# Patient Record
Sex: MALE | Race: WHITE | NOT HISPANIC OR LATINO | Employment: FULL TIME | URBAN - METROPOLITAN AREA
[De-identification: names, ages, dates, MRNs, and addresses within clinical notes are randomized per-mention and may not be internally consistent; named-entity substitution may affect disease eponyms.]

---

## 2017-01-06 RX ORDER — DOXYCYCLINE 100 MG/1
CAPSULE ORAL
Qty: 30 CAPSULE | Refills: 3 | Status: SHIPPED | OUTPATIENT
Start: 2017-01-06 | End: 2017-05-30 | Stop reason: SDUPTHER

## 2017-03-14 DIAGNOSIS — F41.9 ANXIETY: ICD-10-CM

## 2017-03-14 DIAGNOSIS — Z00.00 HEALTHCARE MAINTENANCE: Primary | ICD-10-CM

## 2017-03-21 LAB
ALBUMIN SERPL-MCNC: 5.1 G/DL (ref 3.5–5.2)
ALBUMIN/GLOB SERPL: 2.7 G/DL
ALP SERPL-CCNC: 60 U/L (ref 39–117)
ALT SERPL-CCNC: 16 U/L (ref 1–41)
APPEARANCE UR: CLEAR
AST SERPL-CCNC: 20 U/L (ref 1–40)
BACTERIA #/AREA URNS HPF: NORMAL /HPF
BASOPHILS # BLD AUTO: 0.02 10*3/MM3 (ref 0–0.2)
BASOPHILS NFR BLD AUTO: 0.3 % (ref 0–1.5)
BILIRUB SERPL-MCNC: 0.8 MG/DL (ref 0.1–1.2)
BILIRUB UR QL STRIP: NEGATIVE
BUN SERPL-MCNC: 10 MG/DL (ref 6–20)
BUN/CREAT SERPL: 10.5 (ref 7–25)
C TRACH RRNA SPEC QL NAA+PROBE: NEGATIVE
CALCIUM SERPL-MCNC: 10.1 MG/DL (ref 8.6–10.5)
CHLORIDE SERPL-SCNC: 93 MMOL/L (ref 98–107)
CHOLEST SERPL-MCNC: 140 MG/DL (ref 0–200)
CO2 SERPL-SCNC: 25.6 MMOL/L (ref 22–29)
COLOR UR: YELLOW
CREAT SERPL-MCNC: 0.95 MG/DL (ref 0.76–1.27)
EOSINOPHIL # BLD AUTO: 0.03 10*3/MM3 (ref 0–0.7)
EOSINOPHIL NFR BLD AUTO: 0.4 % (ref 0.3–6.2)
EPI CELLS #/AREA URNS HPF: NORMAL /HPF
ERYTHROCYTE [DISTWIDTH] IN BLOOD BY AUTOMATED COUNT: 12.6 % (ref 11.5–14.5)
GLOBULIN SER CALC-MCNC: 1.9 GM/DL
GLUCOSE SERPL-MCNC: 86 MG/DL (ref 65–99)
GLUCOSE UR QL: NEGATIVE
HCT VFR BLD AUTO: 43.2 % (ref 40.4–52.2)
HDLC SERPL-MCNC: 45 MG/DL (ref 40–60)
HGB BLD-MCNC: 14.7 G/DL (ref 13.7–17.6)
HGB UR QL STRIP: NEGATIVE
IMM GRANULOCYTES # BLD: 0.03 10*3/MM3 (ref 0–0.03)
IMM GRANULOCYTES NFR BLD: 0.4 % (ref 0–0.5)
KETONES UR QL STRIP: NEGATIVE
LDLC SERPL CALC-MCNC: 80 MG/DL (ref 0–100)
LEUKOCYTE ESTERASE UR QL STRIP: NEGATIVE
LYMPHOCYTES # BLD AUTO: 2.48 10*3/MM3 (ref 0.9–4.8)
LYMPHOCYTES NFR BLD AUTO: 35.6 % (ref 19.6–45.3)
MCH RBC QN AUTO: 29.1 PG (ref 27–32.7)
MCHC RBC AUTO-ENTMCNC: 34 G/DL (ref 32.6–36.4)
MCV RBC AUTO: 85.5 FL (ref 79.8–96.2)
MICRO URNS: ABNORMAL
MICRO URNS: ABNORMAL
MONOCYTES # BLD AUTO: 0.65 10*3/MM3 (ref 0.2–1.2)
MONOCYTES NFR BLD AUTO: 9.3 % (ref 5–12)
N GONORRHOEA RRNA SPEC QL NAA+PROBE: NEGATIVE
NEUTROPHILS # BLD AUTO: 3.75 10*3/MM3 (ref 1.9–8.1)
NEUTROPHILS NFR BLD AUTO: 54 % (ref 42.7–76)
NITRITE UR QL STRIP: NEGATIVE
PH UR STRIP: 7 [PH] (ref 5–7.5)
PLATELET # BLD AUTO: 315 10*3/MM3 (ref 140–500)
POTASSIUM SERPL-SCNC: 3.8 MMOL/L (ref 3.5–5.2)
PROT SERPL-MCNC: 7 G/DL (ref 6–8.5)
PROT UR QL STRIP: NEGATIVE
RBC # BLD AUTO: 5.05 10*6/MM3 (ref 4.6–6)
RBC #/AREA URNS HPF: NORMAL /HPF
SODIUM SERPL-SCNC: 134 MMOL/L (ref 136–145)
SP GR UR: <=1.005 (ref 1–1.03)
TRIGL SERPL-MCNC: 73 MG/DL (ref 0–150)
TSH SERPL DL<=0.005 MIU/L-ACNC: 1.55 MIU/ML (ref 0.27–4.2)
URINALYSIS REFLEX: ABNORMAL
UROBILINOGEN UR STRIP-MCNC: 0.2 MG/DL (ref 0.2–1)
VLDLC SERPL CALC-MCNC: 14.6 MG/DL (ref 5–40)
WBC # BLD AUTO: 6.96 10*3/MM3 (ref 4.5–10.7)
WBC #/AREA URNS HPF: NORMAL /HPF

## 2017-03-24 ENCOUNTER — OFFICE VISIT (OUTPATIENT)
Dept: INTERNAL MEDICINE | Facility: CLINIC | Age: 22
End: 2017-03-24

## 2017-03-24 VITALS
HEIGHT: 70 IN | RESPIRATION RATE: 12 BRPM | BODY MASS INDEX: 22.48 KG/M2 | SYSTOLIC BLOOD PRESSURE: 130 MMHG | OXYGEN SATURATION: 100 % | TEMPERATURE: 98.1 F | HEART RATE: 96 BPM | DIASTOLIC BLOOD PRESSURE: 80 MMHG | WEIGHT: 157 LBS

## 2017-03-24 DIAGNOSIS — F41.9 ANXIETY: ICD-10-CM

## 2017-03-24 DIAGNOSIS — L70.0 ACNE VULGARIS: ICD-10-CM

## 2017-03-24 DIAGNOSIS — Z00.00 HEALTHCARE MAINTENANCE: Primary | ICD-10-CM

## 2017-03-24 PROCEDURE — 99395 PREV VISIT EST AGE 18-39: CPT | Performed by: INTERNAL MEDICINE

## 2017-03-24 NOTE — PROGRESS NOTES
"Annual Exam (review of medical issues )      KATELIN  Edson Ibarra is a 21 y.o. male RTC in yearly CPE, review of medical issues:   Getting ready to graduate in May. Taken a job with [employer].   1. Acne - overall has been good.  Is using ProActiv at this point but off Adapalene as felt like it did not help. Feels like Doxycycline is helping.  Back lesion are better. Not really getting any whiteheads.  No issues with tolerance of Doxycycline.      off all agents at this time except OTC ProActiv, which we discussed is not likely to be effective long term. OK to hold off on oral doxycycline restart as pt back acne has largely resolved at this time. However, remains with closed comedones on face today despite use of ProActiv. Restart Adapalene topical today. Call if back acne returns and will consider re-addition of oral Abx.   2. Anxiety - \"been under control\".  Thinks family would agree with this.  Notes \"has been too busy to worry about anything\".  Does not feel like phobias are getting in the way right now.  Never ended up seeing talk therapist more than one time.      doing well currently with noted past Health related phobias. Pt has psychology name and number to re-establish with if sx recur.    3. Hx elevated LFT's - has not had time to drink heavily, no real issues. Is trying to keep excess drinking under control.       Review of Systems   Constitution: Negative for chills, fever, malaise/fatigue, weight gain and weight loss.   HENT: Negative for congestion, headaches, hearing loss, odynophagia and sore throat.    Eyes: Negative for discharge, double vision, pain and redness.        Last eye exam unknown, no glasses     Cardiovascular: Negative for chest pain, dyspnea on exertion, irregular heartbeat, leg swelling, near-syncope, palpitations and syncope.   Respiratory: Negative for cough and shortness of breath.    Hematologic/Lymphatic: Negative for bleeding problem. Does not bruise/bleed easily. " "  Skin: Negative for rash and suspicious lesions.   Musculoskeletal: Negative for joint pain, joint swelling, muscle cramps and muscle weakness.   Gastrointestinal: Negative for abdominal pain, constipation, diarrhea, dysphagia, heartburn, nausea and vomiting.   Neurological: Negative for dizziness and light-headedness.   Psychiatric/Behavioral: The patient does not have insomnia.        Problem List:    Patient Active Problem List   Diagnosis   • Acne   • Anxiety   • Allergic rhinitis, seasonal   • Healthcare maintenance       Medical History:    Past Medical History:   Diagnosis Date   • Abnormal liver enzymes 1/29/2016    Description: thought due to ETOH binge prior to labs in 7/2015, resovled on repeat labs.  Abnormal LFT work up negative in 2015.   • Acne 1/29/2016    Description: use ProActiv   • Allergic rhinitis, seasonal 1/29/2016    Description: no prior testing; uses meds Spring > Fall   • Anxiety 1/29/2016    Description: with health related phobias        Social History:    Social History     Social History   • Marital status: Single     Spouse name: N/A   • Number of children: 0   • Years of education: N/A     Occupational History   •       [employer]     Social History Main Topics   • Smoking status: Never Smoker   • Smokeless tobacco: Not on file   • Alcohol use Yes      Comment: \"Slowed down\"; socially only, occasionally binge type    • Drug use: No   • Sexual activity: Yes     Partners: Female     Birth control/ protection: Condom      Comment: one partner; no hx STD's     Other Topics Concern   • Not on file     Social History Narrative    Diet - \"Relatively healthy\", eating fruits and veggies    Exercise - running and golf/ basketball       Family History:   Family History   Problem Relation Age of Onset   • Diabetes Mother    • Melanoma Paternal Grandfather    • Hypertension Paternal Grandfather    • Heart attack Paternal Grandfather        Surgical History:   Past Surgical " History:   Procedure Laterality Date   • OTHER SURGICAL HISTORY      ELBOW SURGERY REPAIR   • OTHER SURGICAL HISTORY      ELBOW SURGERY REPAIR         Current Outpatient Prescriptions:   •  doxycycline (MONODOX) 100 MG capsule, TAKE 1 CAPSULE BY MOUTH DAILY., Disp: 30 capsule, Rfl: 3  •  Benzoyl Peroxide (benzoyl peroxide) 10 % liquid, Apply 1 dose topically Daily., Disp: 156 g, Rfl: 0    Vitals:    03/24/17 0857   BP: 130/80   Pulse: 96   Resp: 12   Temp: 98.1 °F (36.7 °C)   SpO2: 100%       Physical Exam   Constitutional: He is oriented to person, place, and time. He appears well-developed and well-nourished. He is cooperative. He does not have a sickly appearance. He does not appear ill. No distress.   HENT:   Head: Normocephalic and atraumatic.   Right Ear: Hearing, tympanic membrane, external ear and ear canal normal.   Left Ear: Hearing, tympanic membrane, external ear and ear canal normal.   Nose: Nose normal.   Mouth/Throat: Uvula is midline, oropharynx is clear and moist and mucous membranes are normal.   Eyes: Conjunctivae, EOM and lids are normal. Pupils are equal, round, and reactive to light.   Neck: Normal range of motion and full passive range of motion without pain. Neck supple. Carotid bruit is not present. No thyroid mass and no thyromegaly present.   Cardiovascular: Normal rate, regular rhythm, S1 normal, S2 normal, normal heart sounds and intact distal pulses.  Exam reveals no gallop and no friction rub.    No murmur heard.  Pulses:       Radial pulses are 2+ on the right side, and 2+ on the left side.        Dorsalis pedis pulses are 2+ on the right side, and 2+ on the left side.        Posterior tibial pulses are 2+ on the right side, and 2+ on the left side.   Pulmonary/Chest: Effort normal and breath sounds normal. No respiratory distress. He has no wheezes. He has no rhonchi. He has no rales.   Abdominal: Soft. Bowel sounds are normal. He exhibits no distension and no mass. There is no  hepatosplenomegaly. There is no tenderness. There is no rebound and no guarding. Hernia confirmed negative in the right inguinal area and confirmed negative in the left inguinal area.   Genitourinary: Testes normal and penis normal. Right testis shows no mass. Left testis shows no mass. No discharge found.   Musculoskeletal: Normal range of motion. He exhibits no edema.       Vascular Status -  His exam exhibits right foot vasculature abnormal and no right foot edema. His exam exhibits left foot vasculature abnormal and no left foot edema.  Lymphadenopathy:     He has no cervical adenopathy.     He has no axillary adenopathy.        Right: No inguinal adenopathy present.        Left: No inguinal adenopathy present.   Neurological: He is alert and oriented to person, place, and time. He has normal strength and normal reflexes. He displays no tremor. No cranial nerve deficit or sensory deficit. He exhibits normal muscle tone. Gait normal.   Skin: Skin is warm, dry and intact. No rash noted.        Psychiatric: He has a normal mood and affect. His speech is normal and behavior is normal. Cognition and memory are normal.   Vitals reviewed.      Assessment/ Plan  Diagnoses and all orders for this visit:    Healthcare maintenance    Acne vulgaris  -     Benzoyl Peroxide (benzoyl peroxide) 10 % liquid; Apply 1 dose topically Daily.    Anxiety        No Follow-up on file.      Discussion:  Edson Ibarra is a 21 y.o. male RTC in yearly CPE, review of medical issues:   Getting ready to graduate in May. Taken a job with [employer].   1. Acne - much improved on ProActiv and oral Doxycycline, self D/C'd Adapalene for lack of perceived benefit. Given overall improved control, will hold on additional meds at this time, but c/w oral doxycycline with persistent diffuse back and face lesions.  Advised for aggressive SPF protection over summer on doxycycline.  Will trend lesions and wean oral Abx as able.   2. Anxiety  with specific health related phobias - pt doing well. Feels like business of school and new job has kept him focused and reduced anxiety. Saw psychology x 1 visit in past and did not return for f/u.  Monitor sx.            3. Hx elevated LFT's - due to ETOH binge. Numbers resolved on recheck and again today on labs.         4. HM - labs d/w pt; Flu - declined; Tdap - UTD per Peds; Gardasil complete; need to obtain Peds vaccine records; c/w ETOH control and condom use.     RTC one year CPE, F labs prior

## 2017-05-30 RX ORDER — DOXYCYCLINE 100 MG/1
CAPSULE ORAL
Qty: 30 CAPSULE | Refills: 3 | Status: SHIPPED | OUTPATIENT
Start: 2017-05-30 | End: 2018-03-30

## 2018-03-21 DIAGNOSIS — Z00.00 HEALTHCARE MAINTENANCE: Primary | ICD-10-CM

## 2018-03-26 LAB
25(OH)D3+25(OH)D2 SERPL-MCNC: 52 NG/ML (ref 30–100)
ALBUMIN SERPL-MCNC: 5 G/DL (ref 3.5–5.2)
ALBUMIN/GLOB SERPL: 2.3 G/DL
ALP SERPL-CCNC: 65 U/L (ref 39–117)
ALT SERPL-CCNC: 34 U/L (ref 1–41)
APPEARANCE UR: CLEAR
AST SERPL-CCNC: 39 U/L (ref 1–40)
BACTERIA #/AREA URNS HPF: NORMAL /HPF
BASOPHILS # BLD AUTO: 0.02 10*3/MM3 (ref 0–0.2)
BASOPHILS NFR BLD AUTO: 0.1 % (ref 0–1.5)
BILIRUB SERPL-MCNC: 0.5 MG/DL (ref 0.1–1.2)
BILIRUB UR QL STRIP: NEGATIVE
BUN SERPL-MCNC: 14 MG/DL (ref 6–20)
BUN/CREAT SERPL: 13.6 (ref 7–25)
C TRACH RRNA SPEC QL NAA+PROBE: NEGATIVE
CALCIUM SERPL-MCNC: 10.5 MG/DL (ref 8.6–10.5)
CHLORIDE SERPL-SCNC: 99 MMOL/L (ref 98–107)
CHOLEST SERPL-MCNC: 153 MG/DL (ref 0–200)
CO2 SERPL-SCNC: 28.7 MMOL/L (ref 22–29)
COLOR UR: YELLOW
CREAT SERPL-MCNC: 1.03 MG/DL (ref 0.76–1.27)
EOSINOPHIL # BLD AUTO: 0.06 10*3/MM3 (ref 0–0.7)
EOSINOPHIL NFR BLD AUTO: 0.4 % (ref 0.3–6.2)
EPI CELLS #/AREA URNS HPF: NORMAL /HPF
ERYTHROCYTE [DISTWIDTH] IN BLOOD BY AUTOMATED COUNT: 13.1 % (ref 11.5–14.5)
GFR SERPLBLD CREATININE-BSD FMLA CKD-EPI: 108 ML/MIN/1.73
GFR SERPLBLD CREATININE-BSD FMLA CKD-EPI: 89 ML/MIN/1.73
GLOBULIN SER CALC-MCNC: 2.2 GM/DL
GLUCOSE SERPL-MCNC: 97 MG/DL (ref 65–99)
GLUCOSE UR QL: NEGATIVE
HCT VFR BLD AUTO: 45.6 % (ref 40.4–52.2)
HDLC SERPL-MCNC: 58 MG/DL (ref 40–60)
HGB BLD-MCNC: 14.8 G/DL (ref 13.7–17.6)
HGB UR QL STRIP: NEGATIVE
IMM GRANULOCYTES # BLD: 0.05 10*3/MM3 (ref 0–0.03)
IMM GRANULOCYTES NFR BLD: 0.3 % (ref 0–0.5)
KETONES UR QL STRIP: NEGATIVE
LDLC SERPL DIRECT ASSAY-MCNC: 88 MG/DL (ref 0–100)
LEUKOCYTE ESTERASE UR QL STRIP: NEGATIVE
LYMPHOCYTES # BLD AUTO: 1.85 10*3/MM3 (ref 0.9–4.8)
LYMPHOCYTES NFR BLD AUTO: 11.4 % (ref 19.6–45.3)
MCH RBC QN AUTO: 29.4 PG (ref 27–32.7)
MCHC RBC AUTO-ENTMCNC: 32.5 G/DL (ref 32.6–36.4)
MCV RBC AUTO: 90.7 FL (ref 79.8–96.2)
MICRO URNS: NORMAL
MICRO URNS: NORMAL
MONOCYTES # BLD AUTO: 1.7 10*3/MM3 (ref 0.2–1.2)
MONOCYTES NFR BLD AUTO: 10.5 % (ref 5–12)
N GONORRHOEA RRNA SPEC QL NAA+PROBE: NEGATIVE
NEUTROPHILS # BLD AUTO: 12.53 10*3/MM3 (ref 1.9–8.1)
NEUTROPHILS NFR BLD AUTO: 77.3 % (ref 42.7–76)
NITRITE UR QL STRIP: NEGATIVE
PH UR STRIP: 7.5 [PH] (ref 5–7.5)
PLATELET # BLD AUTO: 296 10*3/MM3 (ref 140–500)
POTASSIUM SERPL-SCNC: 4.4 MMOL/L (ref 3.5–5.2)
PROT SERPL-MCNC: 7.2 G/DL (ref 6–8.5)
PROT UR QL STRIP: NEGATIVE
RBC # BLD AUTO: 5.03 10*6/MM3 (ref 4.6–6)
RBC #/AREA URNS HPF: NORMAL /HPF
SODIUM SERPL-SCNC: 140 MMOL/L (ref 136–145)
SP GR UR: 1.01 (ref 1–1.03)
TSH SERPL DL<=0.005 MIU/L-ACNC: 2.35 MIU/ML (ref 0.27–4.2)
URINALYSIS REFLEX: NORMAL
UROBILINOGEN UR STRIP-MCNC: 0.2 MG/DL (ref 0.2–1)
WBC # BLD AUTO: 16.21 10*3/MM3 (ref 4.5–10.7)
WBC #/AREA URNS HPF: NORMAL /HPF

## 2018-03-30 ENCOUNTER — OFFICE VISIT (OUTPATIENT)
Dept: INTERNAL MEDICINE | Facility: CLINIC | Age: 23
End: 2018-03-30

## 2018-03-30 VITALS
SYSTOLIC BLOOD PRESSURE: 120 MMHG | OXYGEN SATURATION: 99 % | RESPIRATION RATE: 12 BRPM | DIASTOLIC BLOOD PRESSURE: 76 MMHG | HEIGHT: 70 IN | BODY MASS INDEX: 24.48 KG/M2 | WEIGHT: 171 LBS | HEART RATE: 102 BPM

## 2018-03-30 DIAGNOSIS — D72.829 LEUKOCYTOSIS, UNSPECIFIED TYPE: ICD-10-CM

## 2018-03-30 DIAGNOSIS — F41.9 ANXIETY: ICD-10-CM

## 2018-03-30 DIAGNOSIS — Z00.00 HEALTHCARE MAINTENANCE: Primary | ICD-10-CM

## 2018-03-30 DIAGNOSIS — F41.8 SITUATIONAL ANXIETY: ICD-10-CM

## 2018-03-30 DIAGNOSIS — L70.0 ACNE VULGARIS: ICD-10-CM

## 2018-03-30 DIAGNOSIS — J30.89 ENVIRONMENTAL AND SEASONAL ALLERGIES: ICD-10-CM

## 2018-03-30 LAB
HCT VFR BLDA CALC: 44.7 %
HGB BLDA-MCNC: 14.8 G/DL
LYMPHOCYTES # BLD: 29.6 %
MCH, POC: 28.2
MCHC, POC: 33.1
MCV, POC: 85.3
MONOCYTES # BLD: 4.3 %
PLATELET # BLD: 400 10*3/MM3
PMV BLD: 7.4 FL
POC NEUTROPHIL: 66.1 %
RBC, POC: 5.24
RDW, POC: 13.5
WBC # BLD: 6.6 10*3/UL

## 2018-03-30 PROCEDURE — 99212 OFFICE O/P EST SF 10 MIN: CPT | Performed by: INTERNAL MEDICINE

## 2018-03-30 PROCEDURE — 85025 COMPLETE CBC W/AUTO DIFF WBC: CPT | Performed by: INTERNAL MEDICINE

## 2018-03-30 PROCEDURE — 99395 PREV VISIT EST AGE 18-39: CPT | Performed by: INTERNAL MEDICINE

## 2018-03-30 RX ORDER — FLUTICASONE PROPIONATE 50 MCG
2 SPRAY, SUSPENSION (ML) NASAL DAILY
Start: 2018-03-30 | End: 2018-04-29

## 2018-03-30 RX ORDER — CEPHALEXIN 500 MG/1
500 CAPSULE ORAL 2 TIMES DAILY
Refills: 3
Start: 2018-03-30 | End: 2019-02-22

## 2018-03-30 RX ORDER — TAZAROTENE 1 MG/G
CREAM TOPICAL NIGHTLY
COMMUNITY
End: 2019-02-22

## 2018-03-30 RX ORDER — CEPHALEXIN 500 MG/1
500 CAPSULE ORAL 2 TIMES DAILY
Refills: 3 | COMMUNITY
Start: 2018-03-15 | End: 2018-03-30

## 2018-03-30 NOTE — PROGRESS NOTES
"Annual Exam (review of medical issues )      KATELIN  Edson Ibarra is a 22 y.o. male RTC In yearly CPE, review of medical issues:   1. Leukocytosis - notes had URI sx when here for labs and noted WBC elevation on labs.  Has had some \"allergy sx\" at time of visit and prior. No fever. No SOA.  Was very upset about WBC elevation noted on labs.   2. Environmental Allergies - notes has always had issues primarily in Spring.  Will use Alavert or other OTC anti-H to help with sx. Largely controls sx, but notes could be better. Has used nasal steroids, but is not using now as \"not to that point\".   Will often wait until sx to take those meds.   3. Acne - was on ProActiv and oral Doxycycline.  Has been seeing dermatologist in [place] and changed to Cephalexin and tazortene cream. Has sulfa cream for PRN. Will use benzoyl peroxide daily as well. Pleased with progress. Has almost no lesions at this time. \"Ya, it has really been helping\".  No side effects other than some mild dryness of skin on face.  4. Anxiety with specific health related phobias - \"notes has been better, besides that (leukocytosis)\".  Notes no real trigger events until got call on labs. Is stressed at work and about life plans but anxiety is not interfering there. Feels like doing well. Saw psychology once in past but never did follow up.          Review of Systems   Constitution: Negative for chills, fever, malaise/fatigue, weight gain and weight loss.   HENT: Positive for congestion (with allergies, mild). Negative for hearing loss, odynophagia and sore throat.    Eyes: Negative for discharge, double vision, pain and redness.        Last eye exam unknown;  No glasses     Cardiovascular: Negative for chest pain, dyspnea on exertion, irregular heartbeat, leg swelling, near-syncope, palpitations and syncope.   Respiratory: Negative for cough and shortness of breath.    Endocrine: Negative for polydipsia, polyphagia and polyuria.   Hematologic/Lymphatic: " "Negative for bleeding problem. Does not bruise/bleed easily.   Skin: Negative for rash and suspicious lesions.   Musculoskeletal: Negative for joint pain, joint swelling, muscle cramps and muscle weakness.   Gastrointestinal: Negative for bloating, abdominal pain, constipation, diarrhea, dysphagia, heartburn, nausea and vomiting.   Genitourinary: Negative for dysuria, frequency, genital sores, hematuria and incomplete emptying.   Neurological: Negative for dizziness, headaches and light-headedness.   Psychiatric/Behavioral: Negative for depression. The patient is nervous/anxious (over helath issues primarily, hx of ). The patient does not have insomnia.    Allergic/Immunologic: Positive for environmental allergies. Negative for persistent infections.       Problem List:    Patient Active Problem List   Diagnosis   • Acne   • Anxiety   • Environmental and seasonal allergies   • Situational anxiety       Medical History:    Past Medical History:   Diagnosis Date   • Abnormal liver enzymes 1/29/2016    Description: thought due to ETOH binge prior to labs in 7/2015, resovled on repeat labs.  Abnormal LFT work up negative in 2015.   • Acne 1/29/2016    Description: use ProActiv   • Allergic rhinitis, seasonal 1/29/2016    Description: no prior testing; uses meds Spring > Fall   • Anxiety 1/29/2016    Description: with health related phobias        Social History:    Social History     Social History   • Marital status: Single     Spouse name: N/A   • Number of children: 0   • Years of education: N/A     Occupational History   •       [employer]     Social History Main Topics   • Smoking status: Never Smoker   • Smokeless tobacco: Never Used   • Alcohol use Yes      Comment: \"Slowed down\"; socially only, occasionally binge type; 2018 \"not much\" 3-4 beers/ week   • Drug use: No   • Sexual activity: Yes     Partners: Female     Birth control/ protection: Condom      Comment: one partner; no hx STD's " "    Other Topics Concern   • Not on file     Social History Narrative    Diet - \"Relatively healthy\", eating fruits and veggies; even better than in past in 2018    Exercise - running and golf/ basketball; weights at gym 5x/ week    Caffeine - 2-3 cups coffee daily, occasional soft drink       Family History:   Family History   Problem Relation Age of Onset   • Diabetes Mother    • Melanoma Paternal Grandfather    • Hypertension Paternal Grandfather    • Heart attack Paternal Grandfather    • No Known Problems Father    • No Known Problems Brother    • No Known Problems Brother    • No Known Problems Brother        Surgical History:   Past Surgical History:   Procedure Laterality Date   • OTHER SURGICAL HISTORY      ELBOW SURGERY REPAIR   • OTHER SURGICAL HISTORY      ELBOW SURGERY REPAIR         Current Outpatient Prescriptions:   •  Fexofenadine HCl (ALLERGY 24-HR PO), Take  by mouth., Disp: , Rfl:   •  tazarotene (AVAGE) 0.1 % cream, Apply  topically Every Night., Disp: , Rfl:   •  Benzoyl Peroxide (benzoyl peroxide) 10 % liquid, Apply 1 dose topically Daily., Disp: 156 g, Rfl: 0  •  cephalexin (KEFLEX) 500 MG capsule, Take 1 capsule by mouth 2 (Two) Times a Day., Disp: , Rfl: 3  •  fluticasone (FLONASE) 50 MCG/ACT nasal spray, 2 sprays into each nostril Daily for 30 days. Administer 2 sprays in each nostril daily, Disp: , Rfl:     Vitals:    03/30/18 0856   BP: 120/76   Pulse: 102   Resp: 12   SpO2: 99%       Physical Exam   Constitutional: He is oriented to person, place, and time. He appears well-developed and well-nourished. He is cooperative. No distress.   HENT:   Head: Normocephalic and atraumatic.   Right Ear: Hearing, tympanic membrane, external ear and ear canal normal.   Left Ear: Hearing, tympanic membrane, external ear and ear canal normal.   Nose: Nose normal.   Mouth/Throat: Uvula is midline, oropharynx is clear and moist and mucous membranes are normal. No oropharyngeal exudate.   Eyes: " Conjunctivae, EOM and lids are normal. Pupils are equal, round, and reactive to light. Right eye exhibits no discharge. Left eye exhibits no discharge.   Neck: Normal range of motion and full passive range of motion without pain. Neck supple. Carotid bruit is not present. No thyroid mass and no thyromegaly present.   Cardiovascular: Normal rate, regular rhythm, S1 normal, S2 normal, normal heart sounds and intact distal pulses.  Exam reveals no gallop and no friction rub.    No murmur heard.  Pulses:       Radial pulses are 2+ on the right side, and 2+ on the left side.        Dorsalis pedis pulses are 2+ on the right side, and 2+ on the left side.        Posterior tibial pulses are 2+ on the right side, and 2+ on the left side.   Pulmonary/Chest: Effort normal and breath sounds normal. No respiratory distress. He has no wheezes. He has no rhonchi. He has no rales.   Abdominal: Soft. Bowel sounds are normal. He exhibits no distension and no mass. There is no hepatosplenomegaly. There is no tenderness. There is no rebound and no guarding. Hernia confirmed negative in the right inguinal area and confirmed negative in the left inguinal area.   Genitourinary: Testes normal and penis normal. Right testis shows no mass. Left testis shows no mass. No discharge found.   Musculoskeletal: Normal range of motion. He exhibits no edema.     Vascular Status -  His right foot exhibits normal foot vasculature  and no edema. His left foot exhibits normal foot vasculature  and no edema.  Skin Integrity  -  His right foot skin is intact.His left foot skin is intact..  Lymphadenopathy:     He has no cervical adenopathy.     He has no axillary adenopathy.        Right: No inguinal adenopathy present.        Left: No inguinal adenopathy present.   Neurological: He is alert and oriented to person, place, and time. He has normal strength and normal reflexes. He displays no tremor. No cranial nerve deficit or sensory deficit. He exhibits  normal muscle tone. Gait normal.   Skin: Skin is warm, dry and intact. No rash noted.   Psychiatric: He has a normal mood and affect. His speech is normal and behavior is normal. Cognition and memory are normal.   Vitals reviewed.    Results for orders placed or performed in visit on 03/30/18   POC CBC With / Auto Diff   Result Value Ref Range    WBC 6.6     RBC 5.24     Hemoglobin 14.8 g/dL    Hematocrit 44.7 %    MCV 85.3     MCH 28.2     MCHC 33.1     RDW-CV 13.5     MPV 7.4 (L)     Platelets 400 10*3/mm3    Neutrophil Rel % 66.1 %    Monocyte Rel % 4.3 %    Lymphocyte Rel % 29.6 %       Assessment/ Plan  Diagnoses and all orders for this visit:    Healthcare maintenance    Acne vulgaris  -     Benzoyl Peroxide (benzoyl peroxide) 10 % liquid; Apply 1 dose topically Daily.    Environmental and seasonal allergies  -     fluticasone (FLONASE) 50 MCG/ACT nasal spray; 2 sprays into each nostril Daily for 30 days. Administer 2 sprays in each nostril daily    Anxiety    Situational anxiety    Leukocytosis, unspecified type  -     POC CBC With / Auto Diff    Other orders  -     Discontinue: cephalexin (KEFLEX) 500 MG capsule; Take 500 mg by mouth 2 (Two) Times a Day.  -     Fexofenadine HCl (ALLERGY 24-HR PO); Take  by mouth.  -     tazarotene (AVAGE) 0.1 % cream; Apply  topically Every Night.  -     cephalexin (KEFLEX) 500 MG capsule; Take 1 capsule by mouth 2 (Two) Times a Day.        Return in about 1 year (around 3/30/2019) for Annual physical.      Discussion:  Edson Ibarra is a 22 y.o. male RTC In yearly CPE, review of medical issues:   1. Leukocytosis - acute issue noted on labs, pt with URI sx at time. Sx have resolved and WBC is back to normal on labs today in office. Reassured pt.    2. Environmental Allergies - long hx, no prior testing.  Spring >> Fall.  C/W anti H non-sedating OTC at this time, add Flonase nasal spray in for season for sx control/ avoidance. Added Flonase to med list today.   3. Acne -  controlled per derm on ProActiv, Cephalexin, and tazortene cream. Sulfa cream for PRN use. Pt looks great today with no lesions noted on exam.   4. Anxiety with specific health related phobias - has been doing better overall, though recent leukocytosis did set off anxiety. Reassured pt and urged him to call and ask questions prior to letting anxiety sprial.  Recall, pt saw psychology x 1 visit in past and did not return for f/u.        5. Hx elevated LFT's - due to ETOH binge. No recurrence. Pt has modified drinking habits and is doing better.       6. HM - labs d/w pt; Flu - declined; Tdap/ Menactra/ Hep B - per Peds records; Gardasil complete; need to obtain Peds vaccine records; U G/C screen negative; c/w ETOH control and condom use.   --> Noted improved LDL over past 2 years. Pt with better diet, ETOH, and exercise habits and I urged pt to continue these habits/ take note of improved lab numbers with [facility].     RTC 1 year CPE, F labs prior

## 2018-10-19 ENCOUNTER — LAB (OUTPATIENT)
Dept: INTERNAL MEDICINE | Facility: CLINIC | Age: 23
End: 2018-10-19

## 2018-10-19 DIAGNOSIS — Z00.00 HEALTHCARE MAINTENANCE: Primary | ICD-10-CM

## 2018-10-19 PROCEDURE — 90471 IMMUNIZATION ADMIN: CPT | Performed by: INTERNAL MEDICINE

## 2018-10-19 PROCEDURE — 90715 TDAP VACCINE 7 YRS/> IM: CPT | Performed by: INTERNAL MEDICINE

## 2018-10-31 LAB
GAMMA INTERFERON BACKGROUND BLD IA-ACNC: 0.05 IU/ML
M TB IFN-G BLD-IMP: NEGATIVE
M TB IFN-G CD4+ BCKGRND COR BLD-ACNC: 0.06 IU/ML
MITOGEN IGNF BLD-ACNC: >10 IU/ML
QUANTIFERON INCUBATION: NORMAL
QUANTIFERON TB2 AG VALUE: 0.05 IU/ML
SERVICE CMNT-IMP: NORMAL

## 2019-02-22 ENCOUNTER — OFFICE VISIT (OUTPATIENT)
Dept: INTERNAL MEDICINE | Facility: CLINIC | Age: 24
End: 2019-02-22

## 2019-02-22 VITALS
DIASTOLIC BLOOD PRESSURE: 80 MMHG | SYSTOLIC BLOOD PRESSURE: 130 MMHG | WEIGHT: 165 LBS | TEMPERATURE: 99 F | HEART RATE: 104 BPM | HEIGHT: 70 IN | OXYGEN SATURATION: 98 % | BODY MASS INDEX: 23.62 KG/M2

## 2019-02-22 DIAGNOSIS — R07.81 RIB PAIN ON LEFT SIDE: ICD-10-CM

## 2019-02-22 DIAGNOSIS — S22.31XD CLOSED FRACTURE OF ONE RIB OF RIGHT SIDE WITH ROUTINE HEALING: Primary | ICD-10-CM

## 2019-02-22 DIAGNOSIS — Z00.00 HEALTHCARE MAINTENANCE: ICD-10-CM

## 2019-02-22 PROCEDURE — 90632 HEPA VACCINE ADULT IM: CPT | Performed by: INTERNAL MEDICINE

## 2019-02-22 PROCEDURE — 90471 IMMUNIZATION ADMIN: CPT | Performed by: INTERNAL MEDICINE

## 2019-02-22 PROCEDURE — 71101 X-RAY EXAM UNILAT RIBS/CHEST: CPT | Performed by: INTERNAL MEDICINE

## 2019-02-22 PROCEDURE — 99214 OFFICE O/P EST MOD 30 MIN: CPT | Performed by: INTERNAL MEDICINE

## 2019-04-17 DIAGNOSIS — Z00.00 HEALTHCARE MAINTENANCE: Primary | ICD-10-CM

## 2019-04-20 LAB
25(OH)D3+25(OH)D2 SERPL-MCNC: 41.5 NG/ML (ref 30–100)
ALBUMIN SERPL-MCNC: 5.2 G/DL (ref 3.5–5.2)
ALBUMIN/GLOB SERPL: 2.5 G/DL
ALP SERPL-CCNC: 54 U/L (ref 39–117)
ALT SERPL-CCNC: 20 U/L (ref 1–41)
APPEARANCE UR: CLEAR
AST SERPL-CCNC: 20 U/L (ref 1–40)
BACTERIA #/AREA URNS HPF: NORMAL /HPF
BASOPHILS # BLD AUTO: 0.03 10*3/MM3 (ref 0–0.2)
BASOPHILS NFR BLD AUTO: 0.5 % (ref 0–1.5)
BILIRUB SERPL-MCNC: 0.6 MG/DL (ref 0.2–1.2)
BILIRUB UR QL STRIP: NEGATIVE
BUN SERPL-MCNC: 13 MG/DL (ref 6–20)
BUN/CREAT SERPL: 13.1 (ref 7–25)
CALCIUM SERPL-MCNC: 10.3 MG/DL (ref 8.6–10.5)
CHLORIDE SERPL-SCNC: 99 MMOL/L (ref 98–107)
CHOLEST SERPL-MCNC: 167 MG/DL (ref 0–200)
CO2 SERPL-SCNC: 28.8 MMOL/L (ref 22–29)
COLOR UR: YELLOW
CREAT SERPL-MCNC: 0.99 MG/DL (ref 0.76–1.27)
EOSINOPHIL # BLD AUTO: 0.15 10*3/MM3 (ref 0–0.4)
EOSINOPHIL NFR BLD AUTO: 2.5 % (ref 0.3–6.2)
EPI CELLS #/AREA URNS HPF: NORMAL /HPF
ERYTHROCYTE [DISTWIDTH] IN BLOOD BY AUTOMATED COUNT: 12.1 % (ref 12.3–15.4)
GLOBULIN SER CALC-MCNC: 2.1 GM/DL
GLUCOSE SERPL-MCNC: 99 MG/DL (ref 65–99)
GLUCOSE UR QL: NEGATIVE
HCT VFR BLD AUTO: 47.5 % (ref 37.5–51)
HGB BLD-MCNC: 15.4 G/DL (ref 13–17.7)
HGB UR QL STRIP: NEGATIVE
IMM GRANULOCYTES # BLD AUTO: 0.04 10*3/MM3 (ref 0–0.05)
IMM GRANULOCYTES NFR BLD AUTO: 0.7 % (ref 0–0.5)
KETONES UR QL STRIP: NEGATIVE
LDLC SERPL DIRECT ASSAY-MCNC: 118 MG/DL (ref 0–100)
LEUKOCYTE ESTERASE UR QL STRIP: NEGATIVE
LYMPHOCYTES # BLD AUTO: 1.63 10*3/MM3 (ref 0.7–3.1)
LYMPHOCYTES NFR BLD AUTO: 27.5 % (ref 19.6–45.3)
MCH RBC QN AUTO: 29.2 PG (ref 26.6–33)
MCHC RBC AUTO-ENTMCNC: 32.4 G/DL (ref 31.5–35.7)
MCV RBC AUTO: 90 FL (ref 79–97)
MICRO URNS: NORMAL
MICRO URNS: NORMAL
MONOCYTES # BLD AUTO: 0.61 10*3/MM3 (ref 0.1–0.9)
MONOCYTES NFR BLD AUTO: 10.3 % (ref 5–12)
MUCOUS THREADS URNS QL MICRO: PRESENT /HPF
NEUTROPHILS # BLD AUTO: 3.47 10*3/MM3 (ref 1.7–7)
NEUTROPHILS NFR BLD AUTO: 58.5 % (ref 42.7–76)
NITRITE UR QL STRIP: NEGATIVE
NRBC BLD AUTO-RTO: 0 /100 WBC (ref 0–0.2)
PH UR STRIP: 7.5 [PH] (ref 5–7.5)
PLATELET # BLD AUTO: 316 10*3/MM3 (ref 140–450)
POTASSIUM SERPL-SCNC: 4.4 MMOL/L (ref 3.5–5.2)
PROT SERPL-MCNC: 7.3 G/DL (ref 6–8.5)
PROT UR QL STRIP: NEGATIVE
RBC # BLD AUTO: 5.28 10*6/MM3 (ref 4.14–5.8)
RBC #/AREA URNS HPF: NORMAL /HPF
SODIUM SERPL-SCNC: 141 MMOL/L (ref 136–145)
SP GR UR: 1.01 (ref 1–1.03)
TSH SERPL DL<=0.005 MIU/L-ACNC: 2.49 MIU/ML (ref 0.27–4.2)
URINALYSIS REFLEX: NORMAL
UROBILINOGEN UR STRIP-MCNC: 0.2 MG/DL (ref 0.2–1)
WBC # BLD AUTO: 5.93 10*3/MM3 (ref 3.4–10.8)
WBC #/AREA URNS HPF: NORMAL /HPF

## 2019-04-26 ENCOUNTER — OFFICE VISIT (OUTPATIENT)
Dept: INTERNAL MEDICINE | Facility: CLINIC | Age: 24
End: 2019-04-26

## 2019-04-26 VITALS
HEART RATE: 107 BPM | WEIGHT: 161 LBS | RESPIRATION RATE: 12 BRPM | HEIGHT: 70 IN | OXYGEN SATURATION: 98 % | SYSTOLIC BLOOD PRESSURE: 130 MMHG | BODY MASS INDEX: 23.05 KG/M2 | TEMPERATURE: 98.9 F | DIASTOLIC BLOOD PRESSURE: 84 MMHG

## 2019-04-26 DIAGNOSIS — L70.0 ACNE VULGARIS: ICD-10-CM

## 2019-04-26 DIAGNOSIS — Z00.00 HEALTHCARE MAINTENANCE: Primary | ICD-10-CM

## 2019-04-26 DIAGNOSIS — J30.89 ENVIRONMENTAL AND SEASONAL ALLERGIES: ICD-10-CM

## 2019-04-26 DIAGNOSIS — R07.81 RIB PAIN ON LEFT SIDE: ICD-10-CM

## 2019-04-26 DIAGNOSIS — S22.31XD CLOSED FRACTURE OF ONE RIB OF RIGHT SIDE WITH ROUTINE HEALING: ICD-10-CM

## 2019-04-26 DIAGNOSIS — F41.8 SITUATIONAL ANXIETY: ICD-10-CM

## 2019-04-26 PROCEDURE — 99395 PREV VISIT EST AGE 18-39: CPT | Performed by: INTERNAL MEDICINE

## 2019-04-26 PROCEDURE — 71101 X-RAY EXAM UNILAT RIBS/CHEST: CPT | Performed by: INTERNAL MEDICINE

## 2019-04-26 PROCEDURE — 99212 OFFICE O/P EST SF 10 MIN: CPT | Performed by: INTERNAL MEDICINE

## 2019-04-26 RX ORDER — LORATADINE 10 MG/1
10 TABLET ORAL DAILY
COMMUNITY

## 2019-04-26 RX ORDER — ASCORBIC ACID 500 MG
500 TABLET ORAL DAILY
COMMUNITY
End: 2020-04-30

## 2019-04-26 RX ORDER — ISOTRETINOIN 40 MG/1
CAPSULE, LIQUID FILLED ORAL
COMMUNITY
Start: 2019-04-03 | End: 2020-04-30

## 2019-04-26 NOTE — PROGRESS NOTES
"Annual Exam (review of medical issues )      HPI  Edson Ibarra is a 23 y.o. male RTC in yearly CPE, review of medical issues:   1. Focal L lower rib pain at L 10th rib anteriorly with fracture - occurred back in 2/2019. Notes pain was getting better.  Had to stop running.  Stopped golf as well.  Had some flare of pain in last week, not sure why. Not stopping pt from deep breaths.  Pain is mostly sitting and laying.  No cough.  No mucous production.  Certain positions can really cause some pain. No skin changes at site of pain. Pain is still focal on L side, has not moved.   Recalls, still does not know why occurred other than had a night where fell asleep on remote control and slept on it all night.     where pt is focally tender on exam.  2. Environmental Allergies - long hx, no prior testing.  Spring >> Fall.  \"I have had some pressure in R eye and blurry vision and sneezing\".  Typical sx. \"itchy burny eye.... I would not even say blurry'.  Is on Claritin.  Has used Flonase in past.  Has not added it this year.   3. Acne - on Accutane now, seeing [facility], Dr. German.   Had a flare of sx that \"was really bad on my forehead\" so went to see derm.  Occurred after stopped taking doxycycline.    4. Anxiety with specific health related phobias - \"pretty bad.... Especially with this rib thing\".  Notes is having some issues sleeping.  \"Just worrying\".  Anxiety is mostly over rib issue.      Review of Systems   Constitution: Negative for chills, fever, malaise/fatigue, weight gain and weight loss.   HENT: Positive for congestion (nasal with allergies). Negative for ear discharge, ear pain, hoarse voice, odynophagia and sore throat.    Eyes: Negative for discharge, double vision, pain and redness.        Last eye exam unknown; no glasses     Cardiovascular: Negative for chest pain, dyspnea on exertion, irregular heartbeat, leg swelling, near-syncope, palpitations and syncope.   Respiratory: Negative for " cough, hemoptysis, shortness of breath, sputum production and wheezing.    Endocrine: Negative for polydipsia, polyphagia and polyuria.   Hematologic/Lymphatic: Negative for bleeding problem. Does not bruise/bleed easily.   Skin: Negative for rash and suspicious lesions.   Musculoskeletal: Negative for joint pain, joint swelling, muscle cramps, muscle weakness and myalgias (at L rib region).   Gastrointestinal: Negative for constipation, diarrhea, dysphagia, heartburn, nausea and vomiting.   Genitourinary: Negative for dysuria, frequency, hematuria and hesitancy.   Neurological: Negative for dizziness, headaches and light-headedness.   Psychiatric/Behavioral: Negative for depression. The patient has insomnia (due to anxiety over rib issue) and is nervous/anxious.    Allergic/Immunologic: Positive for environmental allergies. Negative for persistent infections.       Problem List:    Patient Active Problem List   Diagnosis   • Acne   • Anxiety   • Environmental and seasonal allergies   • Situational anxiety   • Closed fracture of one rib of right side with routine healing       Medical History:    Past Medical History:   Diagnosis Date   • Abnormal liver enzymes 1/29/2016    Description: thought due to ETOH binge prior to labs in 7/2015, resovled on repeat labs.  Abnormal LFT work up negative in 2015.   • Acne 1/29/2016    Description: use ProActiv   • Allergic rhinitis, seasonal 1/29/2016    Description: no prior testing; uses meds Spring > Fall   • Anxiety 1/29/2016    Description: with health related phobias        Social History:    Social History     Socioeconomic History   • Marital status: Single     Spouse name: Not on file   • Number of children: 0   • Years of education: Not on file   • Highest education level: Not on file   Occupational History   • Occupation: [employer]     Comment: [employer]   Tobacco Use   • Smoking status: Never Smoker   • Smokeless tobacco: Never Used   Substance and Sexual  "Activity   • Alcohol use: Yes     Comment: \"Slowed down\"; socially only, occasionally binge type; 2018 \"not much\" 3-4 beers/ week; reduced in 2019 at 1 drink/ week with Accutane   • Drug use: No   • Sexual activity: Yes     Partners: Female     Birth control/protection: Condom     Comment: one partner; no hx STD's   Lifestyle   • Physical activity:     Days per week: 4 days     Minutes per session: 60 min   • Stress: Not on file   Social History Narrative    Diet - \"Relatively healthy\", eating fruits and veggies; even better than in past in 2018    Exercise - running and golf/ basketball; weights at gym 5x/ week; variable    Caffeine - 2-3 cups coffee daily, occasional soft drink       Family History:   Family History   Problem Relation Age of Onset   • Diabetes Mother    • Melanoma Paternal Grandfather    • Hypertension Paternal Grandfather    • Heart attack Paternal Grandfather    • No Known Problems Father    • No Known Problems Brother    • No Known Problems Brother    • No Known Problems Brother        Surgical History:   Past Surgical History:   Procedure Laterality Date   • OTHER SURGICAL HISTORY      ELBOW SURGERY REPAIR   • OTHER SURGICAL HISTORY      ELBOW SURGERY REPAIR         Current Outpatient Medications:   •  Calcium-Magnesium-Vitamin D (CALCIUM 500 PO), Take  by mouth., Disp: , Rfl:   •  loratadine (CLARITIN) 10 MG tablet, Take 10 mg by mouth Daily., Disp: , Rfl:   •  MYORISAN 40 MG capsule, , Disp: , Rfl:   •  vitamin C (ASCORBIC ACID) 500 MG tablet, Take 500 mg by mouth Daily., Disp: , Rfl:     Vitals:    04/26/19 0850   BP: 130/84   Pulse: 107   Resp: 12   Temp: 98.9 °F (37.2 °C)   SpO2: 98%       Physical Exam   Constitutional: He is oriented to person, place, and time. He appears well-developed and well-nourished. He is cooperative. No distress.   HENT:   Head: Normocephalic and atraumatic.   Right Ear: Hearing, tympanic membrane, external ear and ear canal normal.   Left Ear: Hearing, tympanic " membrane, external ear and ear canal normal.   Nose: Nose normal.   Mouth/Throat: Uvula is midline, oropharynx is clear and moist and mucous membranes are normal. No oropharyngeal exudate.   Eyes: Conjunctivae, EOM and lids are normal. Pupils are equal, round, and reactive to light. Right eye exhibits no discharge. Left eye exhibits no discharge. No scleral icterus.   Neck: Normal range of motion and full passive range of motion without pain. Neck supple. Carotid bruit is not present. No thyroid mass and no thyromegaly present.   Cardiovascular: Normal rate, regular rhythm, S1 normal, S2 normal, normal heart sounds and intact distal pulses. Exam reveals no gallop and no friction rub.   No murmur heard.  Pulses:       Radial pulses are 2+ on the right side, and 2+ on the left side.        Dorsalis pedis pulses are 2+ on the right side, and 2+ on the left side.        Posterior tibial pulses are 2+ on the right side, and 2+ on the left side.   Pulmonary/Chest: Effort normal and breath sounds normal. No respiratory distress. He has no wheezes. He has no rhonchi. He has no rales. He exhibits no tenderness and no crepitus.   Abdominal: Soft. Bowel sounds are normal. He exhibits no distension and no mass. There is no hepatosplenomegaly. There is no tenderness. There is no rebound and no guarding. Hernia confirmed negative in the right inguinal area and confirmed negative in the left inguinal area.   Genitourinary: Testes normal and penis normal. Right testis shows no mass. Left testis shows no mass. No discharge found.   Musculoskeletal: Normal range of motion. He exhibits no edema.     Vascular Status -  His right foot exhibits normal foot vasculature  and no edema. His left foot exhibits normal foot vasculature  and no edema.  Skin Integrity  -  His right foot skin is intact.His left foot skin is intact..  Lymphadenopathy:     He has no cervical adenopathy.     He has no axillary adenopathy.        Right: No inguinal  adenopathy present.        Left: No inguinal adenopathy present.   Neurological: He is alert and oriented to person, place, and time. He has normal strength and normal reflexes. He displays no tremor. No cranial nerve deficit or sensory deficit. He exhibits normal muscle tone. Gait normal.   Skin: Skin is warm, dry and intact. No rash noted.        Psychiatric: He has a normal mood and affect. His speech is normal and behavior is normal. Thought content normal. Cognition and memory are normal.   Vitals reviewed.    XR ribs left: pain at L lower rib, focal; Compared to 2/22/19 rib series  No fracture noted today  No mass or airspace disease noted  No effusion  No cardiomegaly  Bony structures are otherwise normal.     Assessment/ Plan  Diagnoses and all orders for this visit:    Healthcare maintenance    Closed fracture of one rib of right side with routine healing  -     XR Ribs Left With PA Chest  -     CT Chest With Contrast; Future    Situational anxiety    Environmental and seasonal allergies    Acne vulgaris    Rib pain on left side  -     CT Chest With Contrast; Future    Other orders  -     MYORISAN 40 MG capsule  -     loratadine (CLARITIN) 10 MG tablet; Take 10 mg by mouth Daily.  -     vitamin C (ASCORBIC ACID) 500 MG tablet; Take 500 mg by mouth Daily.  -     Calcium-Magnesium-Vitamin D (CALCIUM 500 PO); Take  by mouth.        Return in about 1 year (around 4/26/2020).      Discussion:  Edson Ibarra is a 23 y.o. male RTC in yearly CPE, review of medical issues:   1. Focal L lower rib pain at L 10th rib anteriorly with fracture - occurred back in 2/2019, imgaing was suggestive, now > 8 weeks out from event. Pt was improving but has plateaued and gotten a bit worse. Imaging of L ribs and lungs today is clear, and pain is not reproducible on exam. However, pt admits marked anxiety overlying this issue.  Will move forward with CT chest to further eval as pt is very worried well.  I do not anticipate  finding additional pathology, but may help confirm initial dx. Certainly a bone scan may be helpful if CT unrevealing. Will f/u with pt via phone.   2. Environmental Allergies - long hx, no prior testing.  Spring >> Fall.  Overall controlled on Claritin OTC at this time. OK to add Flonase PRN.   3. Acne - on Accutane now, per [facility], Dr. German, after had flare of open comedones on forehead.     4. Anxiety with specific health related phobias -  Really accelerated recently with rib issue.  Pt has declined talk therapy in past, but agreeable today and seems motivated to start. Referral list provided today. Declines meds.  Will reassure pt as appropriate and have asked pt to call if sx accelerate or remain uncontrolled.   5. HM - labs d/w pt; Flu/ Tdap/ Menactra/ Hep B/ Gardasil - UTD; Hep A #2 in 4 months; U G/C screen negative 2018; HIV (-) 2015; c/w ETOH control and condom use; add back exercise.     RTC 1 year CPE, F labs prior  Hep A #2 in 4 months.

## 2019-05-06 ENCOUNTER — APPOINTMENT (OUTPATIENT)
Dept: CT IMAGING | Facility: HOSPITAL | Age: 24
End: 2019-05-06

## 2019-05-08 ENCOUNTER — HOSPITAL ENCOUNTER (OUTPATIENT)
Dept: CT IMAGING | Facility: HOSPITAL | Age: 24
Discharge: HOME | End: 2019-05-08
Admitting: INTERNAL MEDICINE

## 2019-05-08 DIAGNOSIS — S22.31XD CLOSED FRACTURE OF ONE RIB OF RIGHT SIDE WITH ROUTINE HEALING: ICD-10-CM

## 2019-05-08 DIAGNOSIS — R07.81 RIB PAIN ON LEFT SIDE: ICD-10-CM

## 2019-05-08 PROCEDURE — 71260 CT THORAX DX C+: CPT

## 2019-05-08 PROCEDURE — 25010000002 IOPAMIDOL 61 % SOLUTION: Performed by: INTERNAL MEDICINE

## 2019-05-08 RX ADMIN — IOPAMIDOL 75 ML: 612 INJECTION, SOLUTION INTRAVENOUS at 15:12

## 2019-08-06 ENCOUNTER — OFFICE VISIT (OUTPATIENT)
Dept: INTERNAL MEDICINE | Facility: CLINIC | Age: 24
End: 2019-08-06

## 2019-08-06 VITALS
DIASTOLIC BLOOD PRESSURE: 90 MMHG | TEMPERATURE: 98.2 F | HEIGHT: 70 IN | HEART RATE: 91 BPM | OXYGEN SATURATION: 99 % | BODY MASS INDEX: 23.05 KG/M2 | WEIGHT: 161 LBS | SYSTOLIC BLOOD PRESSURE: 136 MMHG

## 2019-08-06 DIAGNOSIS — F41.8 SITUATIONAL ANXIETY: ICD-10-CM

## 2019-08-06 DIAGNOSIS — S22.31XD CLOSED FRACTURE OF ONE RIB OF RIGHT SIDE WITH ROUTINE HEALING: ICD-10-CM

## 2019-08-06 DIAGNOSIS — F41.1 GAD (GENERALIZED ANXIETY DISORDER): Primary | ICD-10-CM

## 2019-08-06 DIAGNOSIS — S39.011D STRAIN OF ABDOMINAL MUSCLE, SUBSEQUENT ENCOUNTER: ICD-10-CM

## 2019-08-06 PROCEDURE — 99214 OFFICE O/P EST MOD 30 MIN: CPT | Performed by: INTERNAL MEDICINE

## 2019-08-06 RX ORDER — PAROXETINE 10 MG/1
10 TABLET, FILM COATED ORAL EVERY MORNING
Qty: 14 TABLET | Refills: 0 | Status: SHIPPED | OUTPATIENT
Start: 2019-08-06 | End: 2019-08-20

## 2019-08-06 RX ORDER — ISOTRETINOIN 40 MG/1
40 CAPSULE ORAL 2 TIMES DAILY
COMMUNITY
End: 2020-04-30

## 2019-08-06 RX ORDER — LORAZEPAM 0.5 MG/1
0.5 TABLET ORAL EVERY 8 HOURS PRN
Qty: 30 TABLET | Refills: 0 | Status: SHIPPED | OUTPATIENT
Start: 2019-08-06 | End: 2020-04-30

## 2019-08-06 RX ORDER — PAROXETINE HYDROCHLORIDE 20 MG/1
20 TABLET, FILM COATED ORAL EVERY MORNING
Qty: 30 TABLET | Refills: 5 | Status: SHIPPED | OUTPATIENT
Start: 2019-08-06 | End: 2020-02-03

## 2019-08-06 RX ORDER — CHLORAL HYDRATE 500 MG
CAPSULE ORAL
COMMUNITY

## 2019-08-06 NOTE — PROGRESS NOTES
"Anxiety and Flank Pain (minor pain on left side )      HPI  Edson Ibarra is a 23 y.o. male  RTC in acute care:  \"I am still having issues with the area in my side.... Just worried about my side\". Admits that bigger issue is anxiety more than the pain issue. \"Not sure how much of the pain is real pain or is in my head\".  Around time had CT scan had noted pain moved down into upper abdomen and is \"more in the muscle\". Only has pain if lays on area or stretch.  If does plank, can \"feel it\".  Will feel with golf swing. No pain iwht standing. Does not wake pt up.  No issues with breathing. No cough. No issues with bowels. No N/V. No rash at site.     Anxiety - just got engaged and getting ready to build a house.  Feels like 'had abig breakdown last week'. Was in tears last week talking to grandmother about stresses. Grandmother was reassuring. Encouraged pt to make appt with me. No other panic attacks. Admits \"anxiety is bad\".  Thinks is ready to go on medication.  \"I want some reassurance\".  Fiancee wants pt to talk to a therapist, pt admits.  \"I would feel better if I had some final reassurance to make sure it is OK\". Admits when was on vacation was \"kneading the shit out of area making it hurt\".  Feels like cannot let the issue go and is near obssesed with this area in side.  Recalls that labs and CT chest were OK.      Review of Systems   Constitution: Negative for chills, fever, malaise/fatigue, weight gain and weight loss.   Cardiovascular: Negative for chest pain and dyspnea on exertion.   Respiratory: Negative for cough, shortness of breath and sputum production.    Skin: Negative for rash and suspicious lesions.   Gastrointestinal: Positive for abdominal pain (LUQ pain, minimal, positional). Negative for change in bowel habit, constipation, diarrhea, nausea and vomiting.       The following portions of the patient's history were reviewed and updated as appropriate: allergies, current medications, past " "medical history, past social history and problem list.      Current Outpatient Medications:   •  ISOtretinoin (ACCUTANE) 40 MG capsule, Take 40 mg by mouth 2 (Two) Times a Day., Disp: , Rfl:   •  Omega-3 Fatty Acids (FISH OIL) 1000 MG capsule capsule, Take  by mouth Daily With Breakfast., Disp: , Rfl:   •  Calcium-Magnesium-Vitamin D (CALCIUM 500 PO), Take  by mouth., Disp: , Rfl:   •  loratadine (CLARITIN) 10 MG tablet, Take 10 mg by mouth Daily., Disp: , Rfl:   •  LORazepam (ATIVAN) 0.5 MG tablet, Take 1 tablet by mouth Every 8 (Eight) Hours As Needed for Anxiety., Disp: 30 tablet, Rfl: 0  •  MYORISAN 40 MG capsule, , Disp: , Rfl:   •  PARoxetine (PAXIL) 10 MG tablet, Take 1 tablet by mouth Every Morning for 14 days., Disp: 14 tablet, Rfl: 0  •  PARoxetine (PAXIL) 20 MG tablet, Take 1 tablet by mouth Every Morning., Disp: 30 tablet, Rfl: 5  •  vitamin C (ASCORBIC ACID) 500 MG tablet, Take 500 mg by mouth Daily., Disp: , Rfl:     Vitals:    08/06/19 0758   BP: 136/90   BP Location: Right arm   Patient Position: Sitting   Cuff Size: Adult   Pulse: 91   Temp: 98.2 °F (36.8 °C)   TempSrc: Oral   SpO2: 99%   Weight: 73 kg (161 lb)   Height: 177.8 cm (70\")         Physical Exam   Constitutional: He is oriented to person, place, and time. He appears well-developed and well-nourished. No distress.   HENT:   Head: Normocephalic and atraumatic.   Mouth/Throat: Oropharyngeal exudate present.   Eyes: Pupils are equal, round, and reactive to light. Right eye exhibits no discharge. Left eye exhibits no discharge. No scleral icterus.   Neck: Normal range of motion. Neck supple. Carotid bruit is not present.   Cardiovascular: Normal rate, regular rhythm and normal heart sounds.   Pulses:       Carotid pulses are 2+ on the right side, and 2+ on the left side.       Radial pulses are 2+ on the right side, and 2+ on the left side.   Pulmonary/Chest: Effort normal and breath sounds normal. No respiratory distress. He has no wheezes. " He has no rales.   Abdominal: Soft. Bowel sounds are normal. He exhibits no distension and no mass. There is no splenomegaly or hepatomegaly. There is no tenderness. There is no rigidity, no rebound and no guarding.   Musculoskeletal: He exhibits no edema.   Neurological: He is alert and oriented to person, place, and time. No cranial nerve deficit.   Psychiatric: His speech is normal and behavior is normal. His mood appears anxious. His affect is not blunt, not labile and not inappropriate. Cognition and memory are normal. He does not exhibit a depressed mood.   Tearful at times during visit when discussing sx   Vitals reviewed.      Assessment/ Plan  Diagnoses and all orders for this visit:    ERIKA (generalized anxiety disorder)  -     PARoxetine (PAXIL) 10 MG tablet; Take 1 tablet by mouth Every Morning for 14 days.  -     PARoxetine (PAXIL) 20 MG tablet; Take 1 tablet by mouth Every Morning.  -     LORazepam (ATIVAN) 0.5 MG tablet; Take 1 tablet by mouth Every 8 (Eight) Hours As Needed for Anxiety.  -     Ambulatory Referral to Psychology    Closed fracture of one rib of right side with routine healing  -     UA / M With / Rflx Culture([facility] ONLY) - Urine, Clean Catch    Situational anxiety  -     PARoxetine (PAXIL) 10 MG tablet; Take 1 tablet by mouth Every Morning for 14 days.  -     PARoxetine (PAXIL) 20 MG tablet; Take 1 tablet by mouth Every Morning.  -     LORazepam (ATIVAN) 0.5 MG tablet; Take 1 tablet by mouth Every 8 (Eight) Hours As Needed for Anxiety.  -     Ambulatory Referral to Psychology    Strain of abdominal muscle, subsequent encounter  -     UA / M With / Rflx Culture([facility] ONLY) - Urine, Clean Catch    Other orders  -     Omega-3 Fatty Acids (FISH OIL) 1000 MG capsule capsule; Take  by mouth Daily With Breakfast.  -     ISOtretinoin (ACCUTANE) 40 MG capsule; Take 40 mg by mouth 2 (Two) Times a Day.        Return in about 4 weeks (around 9/3/2019) for Recheck.      Discussion:  Edson MART  "Stacey is a 23 y.o. male  RTC in acute care with persistent concern about vague L upper quadrant/ lower rib border discomfort. Pt admits that pain changed to more muscular and positional pain after got CT results at last visit. No other associated sx and admits discomfort is mild. Overlying this is long hx of anxiety and marked anxiety over health issues.  Pt is clearly anxious today and becomes tearful when discussing the anxiety if feels over this issue and the anxieties he is experiencing over his recent engagement and home building.  I had clear discussion today that I cannot give pt what he wants, which is \"final reassurance\" that he is \"fine\". I reviewed CT chest and recent lab set that were all negative and reassuring.  I did d/w pt that we have dealt with his anxiety for a long time and pt has continued to struggle with anxiety over a variety of aches/ pains/ ailments and has now generalized anxiety over issues in his life, affecting his QOL and relationship with his fiancee.  Pt has been resistant to both meds and talk therapy in the past but agrees today. Willstart pt on Paroxetine 10 --> 20mg dosing, reviewed side effects including sexual side effects with pt.  Ativan PRN for panic or increase in anxiety with med initiation. Shashi and consent completed in office today.  I gave pt talk therapy list , pointed out specific names and urged pt to get started ASAP with therapist. Ultimately, I told pt that his health related anxieties will not go away until he deals with them with a psychologist and learns to manage them.  Pt seems receptive today.  Will check U/A today again to ensure clear.  Pt to remain off all ETOH at this time. Trend B/P at f/u, suspect mild elevation due to anxiety today.     RTC 4 weeks.     "

## 2019-08-07 LAB
APPEARANCE UR: CLEAR
BACTERIA #/AREA URNS HPF: NORMAL /HPF
BILIRUB UR QL STRIP: NEGATIVE
COLOR UR: YELLOW
EPI CELLS #/AREA URNS HPF: NORMAL /HPF
GLUCOSE UR QL: NEGATIVE
HGB UR QL STRIP: NEGATIVE
KETONES UR QL STRIP: NEGATIVE
LEUKOCYTE ESTERASE UR QL STRIP: NEGATIVE
MICRO URNS: NORMAL
MICRO URNS: NORMAL
MUCOUS THREADS URNS QL MICRO: PRESENT /HPF
NITRITE UR QL STRIP: NEGATIVE
PH UR STRIP: 7 [PH] (ref 5–7.5)
PROT UR QL STRIP: NEGATIVE
RBC #/AREA URNS HPF: NORMAL /HPF
SP GR UR: 1.02 (ref 1–1.03)
URINALYSIS REFLEX: NORMAL
UROBILINOGEN UR STRIP-MCNC: 0.2 MG/DL (ref 0.2–1)
WBC #/AREA URNS HPF: NORMAL /HPF

## 2019-08-26 ENCOUNTER — LAB (OUTPATIENT)
Dept: INTERNAL MEDICINE | Facility: CLINIC | Age: 24
End: 2019-08-26

## 2019-08-26 PROCEDURE — 90471 IMMUNIZATION ADMIN: CPT | Performed by: INTERNAL MEDICINE

## 2019-08-26 PROCEDURE — 90632 HEPA VACCINE ADULT IM: CPT | Performed by: INTERNAL MEDICINE

## 2019-09-06 ENCOUNTER — OFFICE VISIT (OUTPATIENT)
Dept: INTERNAL MEDICINE | Facility: CLINIC | Age: 24
End: 2019-09-06

## 2019-09-06 VITALS
HEART RATE: 93 BPM | HEIGHT: 70 IN | SYSTOLIC BLOOD PRESSURE: 124 MMHG | DIASTOLIC BLOOD PRESSURE: 86 MMHG | BODY MASS INDEX: 23.19 KG/M2 | TEMPERATURE: 98.5 F | WEIGHT: 162 LBS | OXYGEN SATURATION: 99 %

## 2019-09-06 DIAGNOSIS — R10.12 LEFT UPPER QUADRANT PAIN: ICD-10-CM

## 2019-09-06 DIAGNOSIS — Z00.00 HEALTHCARE MAINTENANCE: ICD-10-CM

## 2019-09-06 DIAGNOSIS — F41.8 SITUATIONAL ANXIETY: ICD-10-CM

## 2019-09-06 DIAGNOSIS — F41.1 GAD (GENERALIZED ANXIETY DISORDER): Primary | ICD-10-CM

## 2019-09-06 PROCEDURE — 90471 IMMUNIZATION ADMIN: CPT | Performed by: INTERNAL MEDICINE

## 2019-09-06 PROCEDURE — 99213 OFFICE O/P EST LOW 20 MIN: CPT | Performed by: INTERNAL MEDICINE

## 2019-09-06 PROCEDURE — 90674 CCIIV4 VAC NO PRSV 0.5 ML IM: CPT | Performed by: INTERNAL MEDICINE

## 2019-09-06 NOTE — PROGRESS NOTES
"Anxiety      HPI  Edson Ibarra is a 23 y.o. male RTC In f/u:   Is now four weeks in on Paxil.  Had some mild nausea initially but resolved. No vomiting. NO HA issues \"for the most part\". Some days are 'better' with overall general anxiety.  Has used \"less than 5 \" of Ativan.  No panic attacks since last visit.   Has investigated talk therapy.  Is planning to decide this week. Has not looked into insurance coverage.  Does not need to be in [facility], but would like names.     Thinks discomfort in side. \"I think it is a little better'.  Better overall. No change in quality or location.  \"I am also messing with it though\"  Will be noted if sititng still. NO issues with stretching.       Review of Systems   Gastrointestinal: Positive for abdominal pain (focal, mild in LUQ, no change, intermittent). Negative for change in bowel habit and nausea (resolved).   Neurological: Negative for dizziness, headaches and light-headedness.   Psychiatric/Behavioral: Negative for depression. The patient is nervous/anxious (imrpoved).        The following portions of the patient's history were reviewed and updated as appropriate: allergies, current medications, past medical history, past social history and problem list.      Current Outpatient Medications:   •  Calcium-Magnesium-Vitamin D (CALCIUM 500 PO), Take  by mouth., Disp: , Rfl:   •  ISOtretinoin (ACCUTANE) 40 MG capsule, Take 40 mg by mouth 2 (Two) Times a Day., Disp: , Rfl:   •  loratadine (CLARITIN) 10 MG tablet, Take 10 mg by mouth Daily., Disp: , Rfl:   •  LORazepam (ATIVAN) 0.5 MG tablet, Take 1 tablet by mouth Every 8 (Eight) Hours As Needed for Anxiety., Disp: 30 tablet, Rfl: 0  •  MYORISAN 40 MG capsule, , Disp: , Rfl:   •  Omega-3 Fatty Acids (FISH OIL) 1000 MG capsule capsule, Take  by mouth Daily With Breakfast., Disp: , Rfl:   •  PARoxetine (PAXIL) 20 MG tablet, Take 1 tablet by mouth Every Morning., Disp: 30 tablet, Rfl: 5  •  vitamin C (ASCORBIC ACID) 500 MG " "tablet, Take 500 mg by mouth Daily., Disp: , Rfl:     Vitals:    09/06/19 0841   BP: 124/86   Pulse: 93   Temp: 98.5 °F (36.9 °C)   SpO2: 99%   Weight: 73.5 kg (162 lb)   Height: 177.8 cm (70\")         Physical Exam   Constitutional: He is oriented to person, place, and time. He appears well-developed and well-nourished. No distress.   HENT:   Head: Normocephalic and atraumatic.   Pulmonary/Chest: Effort normal. No respiratory distress.   Abdominal: Soft. Bowel sounds are normal. He exhibits no distension and no mass. There is no splenomegaly. There is no tenderness. There is no rebound and no guarding.   Neurological: He is alert and oriented to person, place, and time. No cranial nerve deficit.   Psychiatric: He has a normal mood and affect. His behavior is normal.   Vitals reviewed.      Assessment/ Plan  Diagnoses and all orders for this visit:    ERIKA (generalized anxiety disorder)    Healthcare maintenance  -     Flucelvax Quad=>4Years (PFS)    Situational anxiety    Left upper quadrant pain        No Follow-up on file.      Discussion:  Edson Ibarra is a 23 y.o. male RTC In f/u for eval after recent start of paroxetine 10mg --> 20mg for ERIKA and marked health related anxiety.  Pt is doing well on med with some improved sx already and minimal use of Ativan, no panic events. Nausea side effect resolved. Pt is working on talk therapy options and I again urged pt strongly to establish with therapist.  Pt agreeable. I will get some names for [facility] psychologists. Will c/w current dosing and reassess at 8 month appt for CPE, sooner if needed.     LUQ discomfort is a bit better, though pt is still \"messing with it\".  I urged pt again to stop touching area.  U/A clear last visit.  Recall CT chest in past negative and reassuring.  Will monitor for now.   HM - flu shot today.     RTC as planned.     "

## 2020-02-01 DIAGNOSIS — F41.1 GAD (GENERALIZED ANXIETY DISORDER): ICD-10-CM

## 2020-02-01 DIAGNOSIS — F41.8 SITUATIONAL ANXIETY: ICD-10-CM

## 2020-02-03 RX ORDER — PAROXETINE HYDROCHLORIDE 20 MG/1
TABLET, FILM COATED ORAL
Qty: 90 TABLET | Refills: 1 | Status: SHIPPED | OUTPATIENT
Start: 2020-02-03 | End: 2020-04-30

## 2020-04-30 ENCOUNTER — TELEMEDICINE (OUTPATIENT)
Dept: INTERNAL MEDICINE | Facility: CLINIC | Age: 25
End: 2020-04-30

## 2020-04-30 DIAGNOSIS — J30.89 ENVIRONMENTAL AND SEASONAL ALLERGIES: ICD-10-CM

## 2020-04-30 DIAGNOSIS — F41.1 GAD (GENERALIZED ANXIETY DISORDER): Primary | ICD-10-CM

## 2020-04-30 DIAGNOSIS — Z00.00 HEALTHCARE MAINTENANCE: ICD-10-CM

## 2020-04-30 DIAGNOSIS — F41.8 SITUATIONAL ANXIETY: ICD-10-CM

## 2020-04-30 PROCEDURE — 99214 OFFICE O/P EST MOD 30 MIN: CPT | Performed by: INTERNAL MEDICINE

## 2020-04-30 RX ORDER — MULTIVIT-MIN/FOLIC/VIT K/LYCOP 400-300MCG
TABLET ORAL
COMMUNITY

## 2020-04-30 NOTE — PROGRESS NOTES
"Anxiety and Allergies (Seasonal)      HPI  Edson Ibarra is a 24 y.o. male presents in f/u via video link in Epic. This visit is occurring during the COVID 19 pandemic.    You have chosen to receive care through a telehealth visit.  Do you consent to use a video/audio connection for your medical care today? Yes    Is time for CPE, but replaced with video visit today.   Has been doing well.  \"Been super busy lately.  Building house and moving in 3 weeks.  Planning wedding\". Work has been busy.  Health has been \"pretty good, not too bad\".     1. Focal L lower rib pain at L 10th rib anteriorly with fracture - \"definitely not what it was\". Still there at times when sleeps on it. Not really pain, more 'like a stiffness'.  Was able to install jordy in house this weekend with no sx. Is still 'messing with it. Ya, to be honest, ya'.  Fiancee 'hates that I do it'.    2. Environmental Allergies - long hx, no prior testing.  Spring >> Fall. \"here lately they have been (an issue). Sneezing, dry eyes, drainage.  Using generic for Allegra.  Helping, 'ya it is'.  Does not feel like needs more meds.   3. Anxiety with specific health related phobias -  notes that mood is better. Job change has helped, less time to 'sit and think about things'. Notes it is better, 'not like it was'.  Stopped paroxetine as 'was not a huge fan of the way it made me feel... Sluggish and lazy\". Stopped with taper and feels like 'been fine'.  Has program with work, EAP, and called a few times and did not call back.  Did have list of psychologist and 'just never really followed up on that\".       Review of Systems   Constitution: Negative for chills, fever and malaise/fatigue (resolved off paroxetine).   HENT: Positive for congestion (mild, with allergies). Negative for sore throat.    Cardiovascular: Negative for dyspnea on exertion.   Respiratory: Negative for shortness of breath.    Psychiatric/Behavioral: Negative for altered mental status, " depression and hallucinations. The patient is nervous/anxious (variable, better than it was).        The following portions of the patient's history were reviewed and updated as appropriate: allergies, current medications, past family history, past medical history, past social history, past surgical history and problem list.      Current Outpatient Medications:   •  loratadine (CLARITIN) 10 MG tablet, Take 10 mg by mouth Daily., Disp: , Rfl:   •  Multiple Vitamin (ONE-A-DAY MENS) tablet, , Disp: , Rfl:   •  Omega-3 Fatty Acids (FISH OIL) 1000 MG capsule capsule, Take  by mouth Daily With Breakfast., Disp: , Rfl:     There were no vitals filed for this visit.  There is no height or weight on file to calculate BMI.      Physical Exam   Constitutional: He is oriented to person, place, and time. He appears well-developed and well-nourished. No distress.   HENT:   Head: Normocephalic.   Pulmonary/Chest: Effort normal. No respiratory distress.   Neurological: He is alert and oriented to person, place, and time.   Psychiatric: He has a normal mood and affect. His behavior is normal. Thought content normal.   Vitals reviewed.      Assessment/ Plan  Diagnoses and all orders for this visit:    ERIKA (generalized anxiety disorder)    Environmental and seasonal allergies    Situational anxiety    Healthcare maintenance    Other orders  -     Multiple Vitamin (ONE-A-DAY MENS) tablet        Return in about 1 year (around 4/30/2021) for Annual physical.      Discussion:  Edson Ibarra is a 24 y.o. male presents in f/u via video link in Epic. This visit is occurring during the COVID 19 pandemic.  Replacing annual in person CPE.   1. Focal L lower rib pain at L 10th rib anteriorly with fracture - recall,    occurred back in 2/2019, imgaing was suggestive. CT chest thereafter was negative, reviewed 4/26/19 CT today.  Overall, issue has largely resolved but pt still notes a sensation there and will ' mess with it'.  I think this is  more a bellweather of his anxiety and his health related phobias and we discussed this.  I advise to not touch area unless necessary.  Address health phobias with therapist.   2. Environmental Allergies - Spring >> Fall, current sx overall managed on Allegra daily. C/W same.   3. Anxiety with specific health related phobias -  major issue for pt long term, at times debilitating. Right now in better place with calmer anxiety and busy schedule to distract. Pt feels better after self wean of paroxetine due to side effects of sluggishness and fatigue.  However, I was very clear with pt today that his use of EAP for his long term issues was not really appropriate and not likely to give him the help and benefit he needs. I urged him, again, to establish with talk therapist for long term management strategy.  I will continue to address this issue with pt, and I told him so, as likely sx will flare over time. I did note that he still has issues with #1 and nancie is still on his case about this, evidence of persisent underlying issues.    5. HM - Hep A series completed.      RTC 1 year CPE, F labs prior    Total time of visit 22 minutes.

## 2021-11-23 NOTE — PROGRESS NOTES
"discomfort in left rib      HPI  Edson Ibarra is a 23 y.o. male RTC in acute care:  Has all vaccine records today.  Wants to review and get chart updated. Has never had Hep A vaccine.  Started with some \"tightness\" in L side over ribs about 3 weeks ago. Sx is constant.  No change with deep breath.  Does subside when stands up. Really sx is only there when sitting.  No cough. No SOA. No rash at site.   Can find a spot that has some discomfort with it. Notes is anxious about the issue and is \"messing with it\".  Is just rubbing over area.   Notes fell asleep on remote control several weeks ago, was on L side. That could be source.   Has been running more recently but nothing extreme. Taken a break from weights since sx started.          Review of Systems   Constitution: Negative for chills and fever.   Cardiovascular: Positive for chest pain (on L side over rib; No pleuritic component noted). Negative for dyspnea on exertion, irregular heartbeat and palpitations.   Respiratory: Negative for cough, shortness of breath, sputum production and wheezing.    Skin: Negative for rash.   Musculoskeletal: Negative for falls and joint pain.   Gastrointestinal: Negative for abdominal pain, nausea and vomiting.       The following portions of the patient's history were reviewed and updated as appropriate: allergies, current medications, past medical history, past social history and problem list.      Current Outpatient Medications:   •  diclofenac (VOLTAREN) 1 % gel gel, Apply 4 g topically to the appropriate area as directed 4 (Four) Times a Day As Needed (rib pain)., Disp: 100 g, Rfl: 1    Vitals:    02/22/19 1349   BP: 130/80   Pulse: 104   Temp: 99 °F (37.2 °C)   SpO2: 98%   Weight: 74.8 kg (165 lb)   Height: 177.8 cm (70\")         Physical Exam   Constitutional: He is oriented to person, place, and time. He appears well-developed and well-nourished. No distress.   HENT:   Head: Normocephalic and atraumatic.   Eyes: Pupils " are equal, round, and reactive to light.   Neck: Carotid bruit is not present.   Cardiovascular: Normal rate, regular rhythm and normal heart sounds.   Pulses:       Carotid pulses are 2+ on the right side, and 2+ on the left side.       Radial pulses are 2+ on the right side, and 2+ on the left side.   Pulmonary/Chest: Effort normal and breath sounds normal. No tachypnea. No respiratory distress. He has no decreased breath sounds. He has no wheezes. He has no rhonchi. He has no rales. He exhibits bony tenderness (focal over single L lower rib just anterior to mid-axillary line). He exhibits no tenderness, no crepitus, no edema and no swelling. Right breast exhibits no inverted nipple. Left breast exhibits no inverted nipple.   Lymphadenopathy:     He has no axillary adenopathy.   Neurological: He is alert and oriented to person, place, and time. No cranial nerve deficit.   Skin: No rash noted. No erythema.   Psychiatric: He has a normal mood and affect. His behavior is normal.   Vitals reviewed.    XR L rib series  - focal pain over L lower rib; No prior for comparison  Lungs clear  No cardiomegaly  Focal fracture at L anterior 10th rib, no displacement      Assessment/ Plan  Diagnoses and all orders for this visit:    Closed fracture of one rib of right side with routine healing  -     diclofenac (VOLTAREN) 1 % gel gel; Apply 4 g topically to the appropriate area as directed 4 (Four) Times a Day As Needed (rib pain).    Rib pain on left side  -     XR Ribs Left With PA Chest  -     diclofenac (VOLTAREN) 1 % gel gel; Apply 4 g topically to the appropriate area as directed 4 (Four) Times a Day As Needed (rib pain).    Healthcare maintenance  -     Hepatitis A Vaccine Adult IM        Return for Next scheduled follow up.      Discussion:  Edson Ibarra is a 23 y.o. male RTC In acute care (new issue to examiner) with focal L lower rib pain for last 3 weeks after pt fell asleep on remote control and slept on it all  night. Pain is overall not debilitatiting and is very dependent on position.  X-ray is suggestive of fracture, non-displaced, at L 10th rib anteriorly where pt is focally tender on exam. Otherwise, lungs are clear.  I d/w pt time course for healing and that no surgical intervention needed.  Will reassess pt at f/u one month at CPE. I will give pt Rx for Voltaren gel today to use topically PRN for pain control.   HM - Hep A #1 today.     RTC as scheduled.      CREST (calcinosis, Raynaud's phenomenon, esophageal dysfunction, sclerodactyly, telangiectasia)

## 2023-06-15 DIAGNOSIS — Z00.00 ANNUAL PHYSICAL EXAM: Primary | ICD-10-CM

## 2023-06-17 LAB
ALBUMIN SERPL-MCNC: 5 G/DL (ref 3.5–5.2)
ALBUMIN/CREAT UR: <6 MG/G CREAT (ref 0–29)
ALBUMIN/GLOB SERPL: 2.6 G/DL
ALP SERPL-CCNC: 65 U/L (ref 39–117)
ALT SERPL-CCNC: 23 U/L (ref 1–41)
APPEARANCE UR: CLEAR
AST SERPL-CCNC: 20 U/L (ref 1–40)
BACTERIA #/AREA URNS HPF: NORMAL /HPF
BASOPHILS # BLD AUTO: 0.04 10*3/MM3 (ref 0–0.2)
BASOPHILS NFR BLD AUTO: 0.7 % (ref 0–1.5)
BILIRUB SERPL-MCNC: 0.5 MG/DL (ref 0–1.2)
BILIRUB UR QL STRIP: NEGATIVE
BUN SERPL-MCNC: 10 MG/DL (ref 6–20)
BUN/CREAT SERPL: 10.2 (ref 7–25)
CALCIUM SERPL-MCNC: 10.3 MG/DL (ref 8.6–10.5)
CASTS URNS QL MICRO: NORMAL /LPF
CHLORIDE SERPL-SCNC: 101 MMOL/L (ref 98–107)
CHOLEST SERPL-MCNC: 178 MG/DL (ref 0–200)
CO2 SERPL-SCNC: 30.7 MMOL/L (ref 22–29)
COLOR UR: YELLOW
CREAT SERPL-MCNC: 0.98 MG/DL (ref 0.76–1.27)
CREAT UR-MCNC: 50.8 MG/DL
EGFRCR SERPLBLD CKD-EPI 2021: 107.7 ML/MIN/1.73
EOSINOPHIL # BLD AUTO: 0.15 10*3/MM3 (ref 0–0.4)
EOSINOPHIL NFR BLD AUTO: 2.7 % (ref 0.3–6.2)
EPI CELLS #/AREA URNS HPF: NORMAL /HPF (ref 0–10)
ERYTHROCYTE [DISTWIDTH] IN BLOOD BY AUTOMATED COUNT: 12.4 % (ref 12.3–15.4)
GLOBULIN SER CALC-MCNC: 1.9 GM/DL
GLUCOSE SERPL-MCNC: 88 MG/DL (ref 65–99)
GLUCOSE UR QL STRIP: NEGATIVE
HBA1C MFR BLD: 5 % (ref 4.8–5.6)
HCT VFR BLD AUTO: 44.9 % (ref 37.5–51)
HDLC SERPL-MCNC: 43 MG/DL (ref 40–60)
HGB BLD-MCNC: 15.4 G/DL (ref 13–17.7)
HGB UR QL STRIP: NEGATIVE
IMM GRANULOCYTES # BLD AUTO: 0.02 10*3/MM3 (ref 0–0.05)
IMM GRANULOCYTES NFR BLD AUTO: 0.4 % (ref 0–0.5)
KETONES UR QL STRIP: NEGATIVE
LDLC SERPL CALC-MCNC: 120 MG/DL (ref 0–100)
LEUKOCYTE ESTERASE UR QL STRIP: NEGATIVE
LYMPHOCYTES # BLD AUTO: 1.8 10*3/MM3 (ref 0.7–3.1)
LYMPHOCYTES NFR BLD AUTO: 32.8 % (ref 19.6–45.3)
MCH RBC QN AUTO: 30 PG (ref 26.6–33)
MCHC RBC AUTO-ENTMCNC: 34.3 G/DL (ref 31.5–35.7)
MCV RBC AUTO: 87.4 FL (ref 79–97)
MICRO URNS: NORMAL
MICRO URNS: NORMAL
MICROALBUMIN UR-MCNC: <3 UG/ML
MONOCYTES # BLD AUTO: 0.6 10*3/MM3 (ref 0.1–0.9)
MONOCYTES NFR BLD AUTO: 10.9 % (ref 5–12)
NEUTROPHILS # BLD AUTO: 2.88 10*3/MM3 (ref 1.7–7)
NEUTROPHILS NFR BLD AUTO: 52.5 % (ref 42.7–76)
NITRITE UR QL STRIP: NEGATIVE
NRBC BLD AUTO-RTO: 0 /100 WBC (ref 0–0.2)
PH UR STRIP: 7 [PH] (ref 5–7.5)
PLATELET # BLD AUTO: 309 10*3/MM3 (ref 140–450)
POTASSIUM SERPL-SCNC: 4.9 MMOL/L (ref 3.5–5.2)
PROT SERPL-MCNC: 6.9 G/DL (ref 6–8.5)
PROT UR QL STRIP: NEGATIVE
RBC # BLD AUTO: 5.14 10*6/MM3 (ref 4.14–5.8)
RBC #/AREA URNS HPF: NORMAL /HPF (ref 0–2)
SODIUM SERPL-SCNC: 138 MMOL/L (ref 136–145)
SP GR UR STRIP: 1.01 (ref 1–1.03)
TRIGL SERPL-MCNC: 82 MG/DL (ref 0–150)
TSH SERPL DL<=0.005 MIU/L-ACNC: 2.4 UIU/ML (ref 0.27–4.2)
URINALYSIS REFLEX: NORMAL
UROBILINOGEN UR STRIP-MCNC: 0.2 MG/DL (ref 0.2–1)
VLDLC SERPL CALC-MCNC: 15 MG/DL (ref 5–40)
WBC # BLD AUTO: 5.49 10*3/MM3 (ref 3.4–10.8)
WBC #/AREA URNS HPF: NORMAL /HPF (ref 0–5)

## 2023-06-20 PROBLEM — F41.8 SITUATIONAL ANXIETY: Status: RESOLVED | Noted: 2018-03-30 | Resolved: 2023-06-20

## 2024-06-14 DIAGNOSIS — Z13.1 SCREENING FOR DIABETES MELLITUS: ICD-10-CM

## 2024-06-14 DIAGNOSIS — Z13.29 SCREENING FOR THYROID DISORDER: ICD-10-CM

## 2024-06-14 DIAGNOSIS — Z00.00 HEALTHCARE MAINTENANCE: Primary | ICD-10-CM

## 2024-06-20 LAB
ALBUMIN SERPL-MCNC: 4.8 G/DL (ref 3.5–5.2)
ALBUMIN/GLOB SERPL: 2.4 G/DL
ALP SERPL-CCNC: 58 U/L (ref 39–117)
ALT SERPL-CCNC: 19 U/L (ref 1–41)
APPEARANCE UR: CLEAR
AST SERPL-CCNC: 23 U/L (ref 1–40)
BACTERIA #/AREA URNS HPF: NORMAL /[HPF]
BASOPHILS # BLD AUTO: 0.03 10*3/MM3 (ref 0–0.2)
BASOPHILS NFR BLD AUTO: 0.5 % (ref 0–1.5)
BILIRUB SERPL-MCNC: 0.4 MG/DL (ref 0–1.2)
BILIRUB UR QL STRIP: NEGATIVE
BUN SERPL-MCNC: 10 MG/DL (ref 6–20)
BUN/CREAT SERPL: 10 (ref 7–25)
CALCIUM SERPL-MCNC: 10 MG/DL (ref 8.6–10.5)
CASTS URNS QL MICRO: NORMAL /LPF
CHLORIDE SERPL-SCNC: 97 MMOL/L (ref 98–107)
CHOLEST SERPL-MCNC: 159 MG/DL (ref 0–200)
CO2 SERPL-SCNC: 29.3 MMOL/L (ref 22–29)
COLOR UR: YELLOW
CREAT SERPL-MCNC: 1 MG/DL (ref 0.76–1.27)
EGFRCR SERPLBLD CKD-EPI 2021: 104.5 ML/MIN/1.73
EOSINOPHIL # BLD AUTO: 0.14 10*3/MM3 (ref 0–0.4)
EOSINOPHIL NFR BLD AUTO: 2.5 % (ref 0.3–6.2)
EPI CELLS #/AREA URNS HPF: NORMAL /HPF (ref 0–10)
ERYTHROCYTE [DISTWIDTH] IN BLOOD BY AUTOMATED COUNT: 12.3 % (ref 12.3–15.4)
GLOBULIN SER CALC-MCNC: 2 GM/DL
GLUCOSE SERPL-MCNC: 89 MG/DL (ref 65–99)
GLUCOSE UR QL STRIP: NEGATIVE
HBA1C MFR BLD: 5.3 % (ref 4.8–5.6)
HCT VFR BLD AUTO: 45.4 % (ref 37.5–51)
HDLC SERPL-MCNC: 45 MG/DL (ref 40–60)
HGB BLD-MCNC: 15.2 G/DL (ref 13–17.7)
HGB UR QL STRIP: NEGATIVE
IMM GRANULOCYTES # BLD AUTO: 0.04 10*3/MM3 (ref 0–0.05)
IMM GRANULOCYTES NFR BLD AUTO: 0.7 % (ref 0–0.5)
KETONES UR QL STRIP: NEGATIVE
LDLC SERPL CALC-MCNC: 94 MG/DL (ref 0–100)
LDLC/HDLC SERPL: 2.05 {RATIO}
LEUKOCYTE ESTERASE UR QL STRIP: NEGATIVE
LYMPHOCYTES # BLD AUTO: 1.28 10*3/MM3 (ref 0.7–3.1)
LYMPHOCYTES NFR BLD AUTO: 22.5 % (ref 19.6–45.3)
MCH RBC QN AUTO: 29.1 PG (ref 26.6–33)
MCHC RBC AUTO-ENTMCNC: 33.5 G/DL (ref 31.5–35.7)
MCV RBC AUTO: 86.8 FL (ref 79–97)
MICRO URNS: NORMAL
MICRO URNS: NORMAL
MONOCYTES # BLD AUTO: 0.81 10*3/MM3 (ref 0.1–0.9)
MONOCYTES NFR BLD AUTO: 14.2 % (ref 5–12)
NEUTROPHILS # BLD AUTO: 3.39 10*3/MM3 (ref 1.7–7)
NEUTROPHILS NFR BLD AUTO: 59.6 % (ref 42.7–76)
NITRITE UR QL STRIP: NEGATIVE
NRBC BLD AUTO-RTO: 0 /100 WBC (ref 0–0.2)
PH UR STRIP: 7.5 [PH] (ref 5–7.5)
PLATELET # BLD AUTO: 294 10*3/MM3 (ref 140–450)
POTASSIUM SERPL-SCNC: 4.5 MMOL/L (ref 3.5–5.2)
PROT SERPL-MCNC: 6.8 G/DL (ref 6–8.5)
PROT UR QL STRIP: NEGATIVE
RBC # BLD AUTO: 5.23 10*6/MM3 (ref 4.14–5.8)
RBC #/AREA URNS HPF: NORMAL /HPF (ref 0–2)
SODIUM SERPL-SCNC: 136 MMOL/L (ref 136–145)
SP GR UR STRIP: 1.01 (ref 1–1.03)
TRIGL SERPL-MCNC: 109 MG/DL (ref 0–150)
TSH SERPL DL<=0.005 MIU/L-ACNC: 2.28 UIU/ML (ref 0.27–4.2)
URINALYSIS REFLEX: NORMAL
UROBILINOGEN UR STRIP-MCNC: 0.2 MG/DL (ref 0.2–1)
VLDLC SERPL CALC-MCNC: 20 MG/DL (ref 5–40)
WBC # BLD AUTO: 5.69 10*3/MM3 (ref 3.4–10.8)
WBC #/AREA URNS HPF: NORMAL /HPF (ref 0–5)

## 2024-06-24 ENCOUNTER — OFFICE VISIT (OUTPATIENT)
Dept: INTERNAL MEDICINE | Facility: CLINIC | Age: 29
End: 2024-06-24
Payer: COMMERCIAL

## 2024-06-24 VITALS
HEART RATE: 91 BPM | HEIGHT: 70 IN | DIASTOLIC BLOOD PRESSURE: 74 MMHG | WEIGHT: 176.4 LBS | OXYGEN SATURATION: 100 % | SYSTOLIC BLOOD PRESSURE: 122 MMHG | TEMPERATURE: 98.3 F | BODY MASS INDEX: 25.25 KG/M2

## 2024-06-24 DIAGNOSIS — F41.1 GAD (GENERALIZED ANXIETY DISORDER): ICD-10-CM

## 2024-06-24 DIAGNOSIS — L70.0 ACNE VULGARIS: ICD-10-CM

## 2024-06-24 DIAGNOSIS — J30.89 ENVIRONMENTAL AND SEASONAL ALLERGIES: ICD-10-CM

## 2024-06-24 DIAGNOSIS — Z00.01 ENCOUNTER FOR GENERAL ADULT MEDICAL EXAMINATION WITH ABNORMAL FINDINGS: Primary | ICD-10-CM

## 2024-06-24 PROCEDURE — 99395 PREV VISIT EST AGE 18-39: CPT | Performed by: INTERNAL MEDICINE

## 2024-06-24 NOTE — PROGRESS NOTES
"Annual Exam (Review of medical issues)      HPI  Edson Ibarra is a 28 y.o. male RTC in yearly CPE review of medical issues:   Has been doing well.  1. Environmental Allergies - Spring >> Fall. Managed on Claritin seasonal with Flonase add on.  Feels like 'comes and goes., not as bad as last year'.   Did not have to use Flonase.    2. ERIKA with specific health related phobias - \"better\".  Feels like is busier and 'happier in life'. Not sure why better.  Does not really d/w wife much about it, does feels like 'healthier now'.   3. Acne - hx of Accutane, per [facility], Dr. German. Off meds now.     Review of Systems   Constitutional: Negative for chills, fever, malaise/fatigue, weight gain and weight loss.   HENT:  Negative for congestion, hearing loss, odynophagia and sore throat.    Eyes:  Negative for discharge, double vision, pain and redness.        Last eye exam ~2022; no issues; uses blue light glasses for computer     Cardiovascular:  Negative for chest pain, dyspnea on exertion, irregular heartbeat, leg swelling, near-syncope, palpitations and syncope.   Respiratory:  Negative for cough, shortness of breath, sleep disturbances due to breathing and snoring.    Endocrine: Negative for polydipsia, polyphagia and polyuria.   Hematologic/Lymphatic: Negative for bleeding problem. Does not bruise/bleed easily.   Skin:  Negative for rash and suspicious lesions.   Musculoskeletal:  Negative for joint pain, joint swelling, muscle cramps, muscle weakness and myalgias.   Gastrointestinal:  Negative for constipation, diarrhea, dysphagia, heartburn, nausea and vomiting.   Genitourinary:  Negative for bladder incontinence, dysuria, frequency, hematuria, hesitancy and incomplete emptying.   Neurological:  Negative for excessive daytime sleepiness, dizziness, headaches and light-headedness.   Psychiatric/Behavioral:  Negative for depression. The patient does not have insomnia and is not nervous/anxious.  " "  Allergic/Immunologic: Positive for environmental allergies. Negative for persistent infections.       Problem List:    Patient Active Problem List   Diagnosis    Acne    ERIKA (generalized anxiety disorder)    Environmental and seasonal allergies       Medical History:    Past Medical History:   Diagnosis Date    Abnormal liver enzymes 01/29/2016    Description: thought due to ETOH binge prior to labs in 7/2015, resovled on repeat labs.  Abnormal LFT work up negative in 2015.    Acne 01/29/2016    Description: use ProActiv    Allergic     Allergic rhinitis, seasonal 01/29/2016    Description: no prior testing; uses meds Spring > Fall    Anxiety 01/29/2016    Description: with health related phobias    Closed fracture of one rib of right side with routine healing 02/22/2019    Situational anxiety 03/30/2018    Marked anxiety sx around health issues and sx; improved slowly over time        Social History:    Social History     Socioeconomic History    Marital status:     Number of children: 0   Tobacco Use    Smoking status: Never    Smokeless tobacco: Never   Vaping Use    Vaping status: Never Used   Substance and Sexual Activity    Alcohol use: Yes     Alcohol/week: 4.0 standard drinks of alcohol     Types: 4 Cans of beer per week     Comment: \"Slowed down\"; socially only, occasionally binge type; 2018 \"not much\" 3-4 beers/ week; reduced in 2019 at 1 drink/ week with Accutane; 7939-0783 2-3 drinks/ week    Drug use: No    Sexual activity: Yes     Partners: Female     Birth control/protection: None     Comment: one partner; no hx STD's       Family History:   Family History   Problem Relation Age of Onset    Diabetes Mother     No Known Problems Father     No Known Problems Brother     No Known Problems Brother     No Known Problems Brother     Melanoma Paternal Grandfather     Hypertension Paternal Grandfather     Heart attack Paternal Grandfather     No Known Problems Son        Surgical History:   Past " Surgical History:   Procedure Laterality Date    OTHER SURGICAL HISTORY      ELBOW SURGERY REPAIR    OTHER SURGICAL HISTORY      ELBOW SURGERY REPAIR         Current Outpatient Medications:     loratadine (CLARITIN) 10 MG tablet, Take 1 tablet by mouth Daily., Disp: , Rfl:     Multiple Vitamin (ONE-A-DAY MENS) tablet, , Disp: , Rfl:     Vitals:    06/24/24 1508   BP: 122/74   Pulse: 91   Temp: 98.3 °F (36.8 °C)   SpO2: 100%     Body mass index is 25.31 kg/m².    Physical Exam  Vitals reviewed.   Constitutional:       General: He is not in acute distress.     Appearance: Normal appearance. He is well-developed. He is not ill-appearing or toxic-appearing.   HENT:      Head: Normocephalic and atraumatic.      Right Ear: Hearing, tympanic membrane, ear canal and external ear normal. There is no impacted cerumen.      Left Ear: Hearing, tympanic membrane, ear canal and external ear normal. There is no impacted cerumen.      Nose: Nose normal.      Mouth/Throat:      Mouth: Mucous membranes are moist. No oral lesions.      Pharynx: Oropharynx is clear. Uvula midline. No pharyngeal swelling, oropharyngeal exudate, posterior oropharyngeal erythema or uvula swelling.   Eyes:      General: Lids are normal. No scleral icterus.        Right eye: No discharge.         Left eye: No discharge.      Extraocular Movements: Extraocular movements intact.      Conjunctiva/sclera: Conjunctivae normal.      Pupils: Pupils are equal, round, and reactive to light.   Neck:      Thyroid: No thyroid mass or thyromegaly.      Vascular: No carotid bruit.   Cardiovascular:      Rate and Rhythm: Normal rate and regular rhythm.      Pulses:           Radial pulses are 2+ on the right side and 2+ on the left side.        Dorsalis pedis pulses are 2+ on the right side and 2+ on the left side.        Posterior tibial pulses are 2+ on the right side and 2+ on the left side.      Heart sounds: Normal heart sounds, S1 normal and S2 normal. No murmur  heard.     No friction rub. No gallop.   Pulmonary:      Effort: Pulmonary effort is normal. No respiratory distress.      Breath sounds: Normal breath sounds. No wheezing, rhonchi or rales.   Abdominal:      General: Bowel sounds are normal. There is no distension.      Palpations: Abdomen is soft. There is no mass.      Tenderness: There is no abdominal tenderness. There is no guarding or rebound.      Hernia: There is no hernia in the left inguinal area or right inguinal area.   Genitourinary:     Penis: Normal. No discharge.       Testes: Normal.         Right: Mass, tenderness or swelling not present.         Left: Mass, tenderness or swelling not present.      Epididymis:      Right: Normal.      Left: Normal.   Musculoskeletal:         General: Normal range of motion.      Right shoulder: No tenderness, bony tenderness or crepitus. Normal range of motion.      Left shoulder: No tenderness, bony tenderness or crepitus. Normal range of motion.      Right hand: No tenderness or bony tenderness. Normal range of motion. Normal strength.      Left hand: No tenderness or bony tenderness. Normal range of motion. Normal strength.      Cervical back: Full passive range of motion without pain, normal range of motion and neck supple. No rigidity. No muscular tenderness.      Right lower leg: No edema.      Left lower leg: No edema.   Lymphadenopathy:      Cervical: No cervical adenopathy.      Lower Body: No right inguinal adenopathy. No left inguinal adenopathy.   Skin:     General: Skin is warm and dry.      Findings: No rash.          Neurological:      Mental Status: He is alert and oriented to person, place, and time.      Cranial Nerves: No cranial nerve deficit, dysarthria or facial asymmetry.      Sensory: No sensory deficit.      Motor: No weakness, tremor, atrophy or abnormal muscle tone.      Gait: Gait normal.      Deep Tendon Reflexes: Reflexes are normal and symmetric.      Reflex Scores:       Patellar  reflexes are 2+ on the right side and 2+ on the left side.       Achilles reflexes are 2+ on the right side and 2+ on the left side.  Psychiatric:         Attention and Perception: Attention normal.         Mood and Affect: Mood normal.         Speech: Speech normal.         Behavior: Behavior normal. Behavior is cooperative.         Thought Content: Thought content normal.         Assessment/ Plan  Diagnoses and all orders for this visit:    Encounter for general adult medical examination with abnormal findings    ERIKA (generalized anxiety disorder)    Acne vulgaris    Environmental and seasonal allergies        Return in about 1 year (around 6/24/2025) for Annual physical.      Discussion:  Edson Ibarra is a 28 y.o. male RTC in yearly CPE review of medical issues:     1. Environmental Allergies - Spring >> Fall. Managed on Claritin seasonally with Flonase add on PRN.    2. ERIKA with specific health related phobias -improved over time, patient feels in better place with work and life satisfaction in general.  No specific phobia issues at this time noted. C/W TLC with diet and regular exercise as he is.  3. Acne - hx of Accutane, per [facility], Dr. German.   4. HM - labs d/w pt; Flu/ Tdap/ Menactra/ Hep A/ B/ Gardasil - UTD; COVID advised, counseled; HIV (-) 2015; c/w ETOH reduction as is and excellent exercise routine    RTC one year CPE, F labs prior

## 2024-09-16 ENCOUNTER — PATIENT MESSAGE (OUTPATIENT)
Dept: INTERNAL MEDICINE | Facility: CLINIC | Age: 29
End: 2024-09-16
Payer: COMMERCIAL

## 2024-09-16 DIAGNOSIS — L57.8 SOLAR DERMATITIS: Primary | ICD-10-CM

## 2024-09-16 RX ORDER — BETAMETHASONE DIPROPIONATE 0.5 MG/G
LOTION TOPICAL 2 TIMES DAILY
Qty: 60 ML | Refills: 1 | Status: SHIPPED | OUTPATIENT
Start: 2024-09-16

## 2024-09-26 ENCOUNTER — PATIENT MESSAGE (OUTPATIENT)
Dept: INTERNAL MEDICINE | Facility: CLINIC | Age: 29
End: 2024-09-26
Payer: COMMERCIAL